# Patient Record
Sex: FEMALE | Race: WHITE
[De-identification: names, ages, dates, MRNs, and addresses within clinical notes are randomized per-mention and may not be internally consistent; named-entity substitution may affect disease eponyms.]

---

## 2017-10-04 ENCOUNTER — HOSPITAL ENCOUNTER (OUTPATIENT)
Dept: HOSPITAL 75 - CARD | Age: 62
End: 2017-10-04
Attending: PHYSICIAN ASSISTANT
Payer: MEDICARE

## 2017-10-04 VITALS — WEIGHT: 158 LBS | BODY MASS INDEX: 26.33 KG/M2 | HEIGHT: 65 IN

## 2017-10-04 VITALS — DIASTOLIC BLOOD PRESSURE: 92 MMHG | SYSTOLIC BLOOD PRESSURE: 121 MMHG

## 2017-10-04 PROCEDURE — 93017 CV STRESS TEST TRACING ONLY: CPT

## 2017-10-04 PROCEDURE — 78452 HT MUSCLE IMAGE SPECT MULT: CPT

## 2017-10-05 NOTE — STRESS TEST
DATE OF SERVICE:  10/04/2017



LEXISCAN MYOVIEW 



REFERRING PHYSICIAN:

None.



Baseline heart rate is 91.  Baseline blood pressure 121/92.  Baseline EKG is

sinus rhythm with right bundle branch block, occasional PVCs.  



IN SUMMARY:  

The patient was injected with 10.4 mCi of technetium-99 Myoview and the resting

images were obtained.  Then, the patient received 0.4 mg of Lexiscan followed by

30.4 mCi of technetium-99 Myoview.  Throughout the test, there were no EKG

changes.



The resting and stress images were reviewed and compared in the short axis,

horizontal long axis and vertical long axis views.  Review of the images showed

good radiotracer uptake with no ischemia or infarction on SPECT images.  SSS is

zero, TID value 0.99.  On the gated images, the left ventricle appeared to be

normal size with normal contractility.  Calculated ejection fraction 59%.



CONCLUSION:

1.  The patient tolerated Lexiscan well.

2.  Breast attenuation with no significant ischemia or infarction on SPECT

images.

3.  Normal left ventricular size with normal contractility.  Calculated ejection

fraction 59%.





Job ID: [id number]

DocumentID: [id number]

Dictated Date:  10/04/2017 15:38:08

Transcription Date: 10/05/2017 08:02:14

Dictated By: THERON CENTENO MD

## 2017-10-06 ENCOUNTER — HOSPITAL ENCOUNTER (OUTPATIENT)
Dept: HOSPITAL 75 - CARD | Age: 62
End: 2017-10-06
Attending: PHYSICIAN ASSISTANT
Payer: MEDICARE

## 2017-10-06 DIAGNOSIS — I25.10: Primary | ICD-10-CM

## 2017-10-06 DIAGNOSIS — I73.9: ICD-10-CM

## 2017-10-06 DIAGNOSIS — I10: ICD-10-CM

## 2017-10-06 DIAGNOSIS — I65.23: ICD-10-CM

## 2017-10-06 DIAGNOSIS — E78.2: ICD-10-CM

## 2017-10-06 PROCEDURE — 93306 TTE W/DOPPLER COMPLETE: CPT

## 2017-10-11 ENCOUNTER — HOSPITAL ENCOUNTER (OUTPATIENT)
Dept: HOSPITAL 75 - CATH | Age: 62
LOS: 1 days | Discharge: HOME | End: 2017-10-12
Attending: INTERNAL MEDICINE
Payer: MEDICARE

## 2017-10-11 VITALS — DIASTOLIC BLOOD PRESSURE: 78 MMHG | SYSTOLIC BLOOD PRESSURE: 128 MMHG

## 2017-10-11 VITALS — HEIGHT: 65 IN | WEIGHT: 158 LBS | BODY MASS INDEX: 26.33 KG/M2

## 2017-10-11 VITALS — SYSTOLIC BLOOD PRESSURE: 118 MMHG | DIASTOLIC BLOOD PRESSURE: 66 MMHG

## 2017-10-11 VITALS — DIASTOLIC BLOOD PRESSURE: 79 MMHG | SYSTOLIC BLOOD PRESSURE: 126 MMHG

## 2017-10-11 VITALS — SYSTOLIC BLOOD PRESSURE: 125 MMHG | DIASTOLIC BLOOD PRESSURE: 82 MMHG

## 2017-10-11 VITALS — DIASTOLIC BLOOD PRESSURE: 55 MMHG | SYSTOLIC BLOOD PRESSURE: 109 MMHG

## 2017-10-11 VITALS — SYSTOLIC BLOOD PRESSURE: 104 MMHG | DIASTOLIC BLOOD PRESSURE: 64 MMHG

## 2017-10-11 VITALS — SYSTOLIC BLOOD PRESSURE: 94 MMHG | DIASTOLIC BLOOD PRESSURE: 54 MMHG

## 2017-10-11 VITALS — DIASTOLIC BLOOD PRESSURE: 66 MMHG | SYSTOLIC BLOOD PRESSURE: 108 MMHG

## 2017-10-11 VITALS — SYSTOLIC BLOOD PRESSURE: 105 MMHG | DIASTOLIC BLOOD PRESSURE: 48 MMHG

## 2017-10-11 VITALS — SYSTOLIC BLOOD PRESSURE: 110 MMHG | DIASTOLIC BLOOD PRESSURE: 54 MMHG

## 2017-10-11 VITALS — DIASTOLIC BLOOD PRESSURE: 61 MMHG | SYSTOLIC BLOOD PRESSURE: 122 MMHG

## 2017-10-11 VITALS — SYSTOLIC BLOOD PRESSURE: 113 MMHG | DIASTOLIC BLOOD PRESSURE: 51 MMHG

## 2017-10-11 DIAGNOSIS — Z79.899: ICD-10-CM

## 2017-10-11 DIAGNOSIS — I10: ICD-10-CM

## 2017-10-11 DIAGNOSIS — I25.2: ICD-10-CM

## 2017-10-11 DIAGNOSIS — I71.4: ICD-10-CM

## 2017-10-11 DIAGNOSIS — Z95.1: ICD-10-CM

## 2017-10-11 DIAGNOSIS — E78.2: ICD-10-CM

## 2017-10-11 DIAGNOSIS — Z72.0: ICD-10-CM

## 2017-10-11 DIAGNOSIS — I70.213: Primary | ICD-10-CM

## 2017-10-11 LAB
ALBUMIN SERPL-MCNC: 4 GM/DL (ref 3.2–4.5)
ALT SERPL-CCNC: 14 U/L (ref 0–55)
ANION GAP SERPL CALC-SCNC: 7 MMOL/L (ref 5–14)
APTT BLD: 27 SEC (ref 24–35)
AST SERPL-CCNC: 17 U/L (ref 5–34)
BILIRUB SERPL-MCNC: 0.5 MG/DL (ref 0.1–1)
BUN SERPL-MCNC: 5 MG/DL (ref 7–18)
BUN/CREAT SERPL: 7
CALCIUM SERPL-MCNC: 9.4 MG/DL (ref 8.5–10.1)
CHLORIDE SERPL-SCNC: 107 MMOL/L (ref 98–107)
CHOLEST SERPL-MCNC: 243 MG/DL (ref ?–200)
CO2 SERPL-SCNC: 28 MMOL/L (ref 21–32)
CREAT SERPL-MCNC: 0.69 MG/DL (ref 0.6–1.3)
ERYTHROCYTE [DISTWIDTH] IN BLOOD BY AUTOMATED COUNT: 13.8 % (ref 10–14.5)
GFR SERPLBLD BASED ON 1.73 SQ M-ARVRAT: > 60 ML/MIN
GLUCOSE SERPL-MCNC: 95 MG/DL (ref 70–105)
INR PPP: 0.9 (ref 0.8–1.4)
LDLC SERPL DIRECT ASSAY-MCNC: 161 MG/DL (ref 1–129)
MCH RBC QN AUTO: 32 PG (ref 25–34)
MCHC RBC AUTO-ENTMCNC: 33 G/DL (ref 32–36)
MCV RBC AUTO: 98 FL (ref 80–99)
PLATELET # BLD: 246 10^3/UL (ref 130–400)
PMV BLD AUTO: 10.4 FL (ref 7.4–10.4)
POTASSIUM SERPL-SCNC: 4.1 MMOL/L (ref 3.6–5)
PROT SERPL-MCNC: 7.6 GM/DL (ref 6.4–8.2)
PROTHROMBIN TIME: 12.5 SEC (ref 12.2–14.7)
RBC # BLD AUTO: 5.05 10^6/UL (ref 4.35–5.85)
SODIUM SERPL-SCNC: 142 MMOL/L (ref 135–145)
TRIGL SERPL-MCNC: 270 MG/DL (ref ?–150)
VLDLC SERPL CALC-MCNC: 54 MG/DL (ref 5–40)
WBC # BLD AUTO: 8.5 10^3/UL (ref 4.3–11)

## 2017-10-11 PROCEDURE — 37221: CPT

## 2017-10-11 PROCEDURE — 87081 CULTURE SCREEN ONLY: CPT

## 2017-10-11 PROCEDURE — 36415 COLL VENOUS BLD VENIPUNCTURE: CPT

## 2017-10-11 PROCEDURE — 75774 ARTERY X-RAY EACH VESSEL: CPT

## 2017-10-11 PROCEDURE — 85610 PROTHROMBIN TIME: CPT

## 2017-10-11 PROCEDURE — 75625 CONTRAST EXAM ABDOMINL AORTA: CPT

## 2017-10-11 PROCEDURE — 85027 COMPLETE CBC AUTOMATED: CPT

## 2017-10-11 PROCEDURE — 93005 ELECTROCARDIOGRAM TRACING: CPT

## 2017-10-11 PROCEDURE — 85347 COAGULATION TIME ACTIVATED: CPT

## 2017-10-11 PROCEDURE — 85730 THROMBOPLASTIN TIME PARTIAL: CPT

## 2017-10-11 PROCEDURE — 75716 ARTERY X-RAYS ARMS/LEGS: CPT

## 2017-10-11 PROCEDURE — 80061 LIPID PANEL: CPT

## 2017-10-11 PROCEDURE — 80053 COMPREHEN METABOLIC PANEL: CPT

## 2017-10-11 PROCEDURE — 36247 INS CATH ABD/L-EXT ART 3RD: CPT

## 2017-10-11 PROCEDURE — 71010: CPT

## 2017-10-11 PROCEDURE — 80048 BASIC METABOLIC PNL TOTAL CA: CPT

## 2017-10-11 RX ADMIN — SODIUM CHLORIDE SCH MLS/HR: 900 INJECTION, SOLUTION INTRAVENOUS at 14:10

## 2017-10-11 RX ADMIN — SODIUM CHLORIDE SCH MLS/HR: 900 INJECTION, SOLUTION INTRAVENOUS at 23:08

## 2017-10-11 RX ADMIN — OXYCODONE HYDROCHLORIDE AND ACETAMINOPHEN PRN TAB: 5; 325 TABLET ORAL at 15:18

## 2017-10-11 RX ADMIN — OXYCODONE HYDROCHLORIDE AND ACETAMINOPHEN PRN TAB: 5; 325 TABLET ORAL at 20:46

## 2017-10-11 NOTE — XMS REPORT
[facility]

 Created on: 2016



TawnyLeonarda cardoza

External Reference #: [id number]

: 1955

Sex: Female



Demographics







 Address  [address] [place] [address]  [id number]

 

 Home Phone  [phone]

 

 Preferred Language  Unknown

 

 Marital Status  Unknown

 

 Yazidism Affiliation  Unknown

 

 Race  White

 

 Ethnic Group  Not  or 





Author







 Author  FILI FOSS

 

 Organization  [employer]

 

 Address  Unknown

 

 Phone  Unavailable







Care Team Providers







 Care Team Member Name  Role  Phone

 

 FILI FOSS  CP  Unavailable



                                                                



Allergies, Adverse Reactions, Alerts

          





 Substance  Reaction  Event Type

 

 Codeine Sulfate  Info Not Available  Drug Allergy



                                                                               
         



Problems

          





 Problem Type  Condition  Code  Onset Dates  Condition Status

 

 Assessment  Right hip pain  M25.551     Active

 

 Problem  Unspecified infective otitis externa  380.10     Active



                                                                               
                   



Medications

          





 Medication  Code System  Code  Instructions  Start Date  End Date  Status  
Dosage

 

 Effient  NDC  28614676797  10 MG Oral            not defined

 

 Enalapril Maleate  NDC  00955468333  2.5 MG Oral            not defined

 

 Carvedilol  NDC  [id number]  6.25 MG Oral            not defined

 

 Tramadol HCl  NDC  [address]-11  50 mg Orally 3 times a day  2016  
      1 tablet as needed

 

 Atorvastatin Calcium  NDC  [id number]  10 MG Oral            not defined



                                                                               
                                                 



Procedures

          





 Procedure  Coding System  Code  Date

 

 [facility] VISIT ESTABLISHED PATIENT  CPT-4    2016

 

 Office Visit, Est Pt., Level 3  CPT-4  52935  2016

 

 X-RAY EXAM HIP UNI 2-3 VIEWS  CPT-4  33803  2016



                                                                               
                                       



Vital Signs

          





 Date/Time:  2016

 

 Temperature  98.8 F

 

 Weight  147 lbs

 

 Height  64 in

 

 BMI  25.23 Index

 

 Blood Pressure Diastolic  78 mmHg

 

 Blood Pressure Systolic  106 mmHg

 

 Cardiac Monitoring Heart Rate  72 bpm



                                                                              



Results

          No Known Results                                                     
               



Summary Purpose

          [employer] Submission

## 2017-10-11 NOTE — CARDIAC PROCEDURE NOTE-CS/ASA
Pre-Procedure Note


Pre-Op Procedure Note


H&P Reviewed


The H&P was reviewed, patient examined and no changes noted.


Date H&P Reviewed:  Oct 11, 2017


Time H&P Reviewed:  11:55





Conscious Sedation Pre-Proced


Time Reviewed:  11:55


ASA Class:  3











Airway Mallampati Classification: ([place] appropriate class) I.  II.  III,  IV


 


Lungs 


 


Heart 


 


 ASA score


 


 ASA 1: a normal healthy patient


 


 ASA 2:  a patient with a mild systemic disease (mid diabetes, controlled 

hypertension, obesity 


 


x ASA 3:  a patient with a severe systemic disease that limits activity  (angina

, COPD, prior Myocardial infarction)


 


 ASA 4:  a patient with an incapacitating disease that is a constant threat to 

life (CHF, renal failure)


 


 ASA 5:  a moribund patient not expected to survive 24 hrs.  (ruptured aneurysm)


 


 ASA 6:  a declared brain dead patient whose organs are being harvested.


 


 For emergent operations, add the letter E after the classification








Grade 3


Sedation Plan:  Analgesia, Amnesia, Plan communicated to team members, 

Discussed options with patient/fam, Discussed risks with patient/fam


Note


The patient is an appropriate candidate to undergo the planned procedure, 

sedation, and anesthesia.





The patient immediately re-assessed prior to indication.











THERON CENTENO MD Oct 11, 2017 12:29

## 2017-10-11 NOTE — DIAGNOSTIC IMAGING REPORT
Portable upright radiograph of the chest.



INDICATION: Peripheral angiography.



FINDINGS:

The lungs demonstrate interstitial thickening and suggestion of

scarring in the lateral aspect of the mid to lower left lung. No

focal acute consolidation is seen when compared to 2/21/16. The

heart size is at the upper limits of normal. No effusion or

pneumothorax.



The mediastinum and ada appear unremarkable. Post CABG changes

are seen.



IMPRESSION: Chronic appearing interstitial thickening and

scarring. No acute process.



Dictated by: 



  Dictated on workstation # [id number]

## 2017-10-11 NOTE — PERIPHERAL REPORT
Peripheral Report


Physician (s)/Assistant (s)


Physician


THERON CENTENO MD





Pre-Procedure Diagnosis


Pre-Procedure Diagnosis:  peripheral arterial disease





Post-Procedure Note


Procedure Start Date:  Oct 11, 2017


Procedure Start Time:  11:55


Name of Procedure:  


Abdominal aortogram


Bilateral lower extremity runoff


Third order


Additional views


Bilateral iliac kissing stents


Findings/Procedure Note


PROCEDURE NOTE:





After explaining the procedure to the patient, all pros and cons were explained,


all questions were answered.  The patient signed the consent and then she  was


placed on the cardiac catheterization laboratory.





The patient was placed on the cardiac catheterization laboratory.


Groin was prepped SL fashion local anesthesia was used. 


Sheath placed in the right femoral artery


Runoff of the right lower except he was done


Pigtail catheter advanced the abdominal aorta, abdominal aortogram was done


Crossed over to the left side and a straight catheter was placed in the iliac 

artery, runoff was done then advanced to the popliteal artery and I did 

angiogram to evaluate the trifurcation, additional angiogram to evaluate the 

arteries at the foot level.  Pressure was measured during pullback at the 

popliteal artery, proximal SFA, iliac artery, abdominal aorta, right iliac 

artery, there was a 30 mmHg gradient between the abdominal aorta and iliac 

artery.





Patient was given 5008 of heparin, additional sheath was placed in the left 

femoral artery, I advanced to wires through the sheath to the abdominal aorta 

then placed bilateral iliac kissing stent using on the right Omnilink [id number] 

mm and on the left 6 x 29 mm, postdilated the right side with [place] 820 mm 

with excellent results after putting the pigtail through the left sheath and 

performing abdominal aortogram again.


At the end of the procedure both sheaths were removed and closure device used 

on both sides





Radiation 233 mGy





FINDINGS:


Hemodynamics


Abdominal aorta 148/60 mean of 95


Right iliac 129/60 mean of 90


Left iliac 120/60 mean of 89


Proximal /66 mean of 87


Left popliteal 108/66 mean of 85





Anatomy


1.  Abdominal aortogram showed mild aneurysmal dilatation at the abdominal 

aorta and bifurcation, severe stenosis at both iliac arteries, successful using 

kissing stent with Omnilink Elite 7 1029 on the right and 629 on the left, the 

right side was post dilated to 8 mm with excellent results no residual stenosis


2.  Mild to moderate disease at the left lower extremity, down to the foot with 

small vessel disease and slow flow at the foot level


3.  Mild to moderate disease at the right lower extremities down to the 

trifurcation








CONCLUSIONS:


1.  Severe bilateral iliac stenosis successful kissing stent deployment using 

Omnilink Elite 729 on the right expanded to 8 mm and 629 on the left deployed 

up to 6.5 mm with excellent results with no residual stenosis


2.  Small infrarenal abdominal aortic aneurysm


3.  Small vessel disease below the knee bilaterally with slow flow with no 

significant obstructive disease











DISCUSSION AND RECOMMENDATIONS:


I will continue with maximizing medical therapy


Anesthesia Type:  Conscious Sedation


Estimated blood loss (mL):  15 ml


Contrast Amount:  66 ml





Post-Procedure Diagnosis


Post-operative diagnosis:  


Claudication


Peripheral arterial disease


Abdominal aortic aneurysm


Hypertension


Hyperlipidemia


Coronary artery disease











THERON CENTENO MD Oct 11, 2017 12:42

## 2017-10-12 VITALS — SYSTOLIC BLOOD PRESSURE: 102 MMHG | DIASTOLIC BLOOD PRESSURE: 69 MMHG

## 2017-10-12 VITALS — DIASTOLIC BLOOD PRESSURE: 69 MMHG | SYSTOLIC BLOOD PRESSURE: 102 MMHG

## 2017-10-12 VITALS — DIASTOLIC BLOOD PRESSURE: 73 MMHG | SYSTOLIC BLOOD PRESSURE: 95 MMHG

## 2017-10-12 LAB
ANION GAP SERPL CALC-SCNC: 10 MMOL/L (ref 5–14)
BUN SERPL-MCNC: 11 MG/DL (ref 7–18)
BUN/CREAT SERPL: 16
CALCIUM SERPL-MCNC: 8.9 MG/DL (ref 8.5–10.1)
CHLORIDE SERPL-SCNC: 106 MMOL/L (ref 98–107)
CO2 SERPL-SCNC: 24 MMOL/L (ref 21–32)
CREAT SERPL-MCNC: 0.67 MG/DL (ref 0.6–1.3)
ERYTHROCYTE [DISTWIDTH] IN BLOOD BY AUTOMATED COUNT: 13.7 % (ref 10–14.5)
GFR SERPLBLD BASED ON 1.73 SQ M-ARVRAT: > 60 ML/MIN
GLUCOSE SERPL-MCNC: 95 MG/DL (ref 70–105)
MCH RBC QN AUTO: 32 PG (ref 25–34)
MCHC RBC AUTO-ENTMCNC: 32 G/DL (ref 32–36)
MCV RBC AUTO: 99 FL (ref 80–99)
PLATELET # BLD: 226 10^3/UL (ref 130–400)
PMV BLD AUTO: 10.4 FL (ref 7.4–10.4)
POTASSIUM SERPL-SCNC: 4.3 MMOL/L (ref 3.6–5)
RBC # BLD AUTO: 4.6 10^6/UL (ref 4.35–5.85)
SODIUM SERPL-SCNC: 140 MMOL/L (ref 135–145)
WBC # BLD AUTO: 11.4 10^3/UL (ref 4.3–11)

## 2017-10-12 NOTE — DISCHARGE INST-POST CATH
Discharge Inst-CATH


Post Cardiac Cath D/C Inst


Follow Up/Plan


Appointment with Dr. Vergara's office in 2 weeks


CARDIAC CATH DISCHARGE INSTRUCTIONS





*Hold Metformin for 48 hours post heart cath.





ACTIVITY





* Go Home directly and rest.


* Limit activity of the leg (or wrist if it was used) for 7 days including 

aerobics, swimming,


   jogging, bicycling, etc.


* Restrict stair-climbing for 7 days if possible, if not, climb up with your non

-cath leg, then


   bring together on the same step.


* Avoid lifting, pushing, pulling or excessive movement of the affected 

extremity for 7 days.


* Customary sexual activity may be resumed after 2 days-use caution not to use 

a position  


   that strains or causes pain to the affected extremity.


* No driving for 24 hours.


* NO SMOKING. 


* Avoid straining for bowel movements for 7 days.


* Gentle walking on level ground is allowed.


* Returning to work will depend on the type of procedure and the results. Your 

doctor will discuss


   this with you.





CALL YOUR DOCTOR FOR ANY OF THE FOLLOWING:





*If bleeding from the puncture site occurs- Apply gentle pressure to site with 

clean cloth and call


   your doctor or EMS.


* If a knot or lump forms under the skin, increases in size, or causes pain.


* If bruising appears to be worsening or moving further down your leg instead 

of disappearing.


* Temperature above 101 F.





CARE OF YOUR GROIN INCISION;





* Bruising or purple discoloration of the skin near the puncture site is common.


* You may shower only, no bathtub bathing for 5 days.  Be careful to avoid 

slipping as your


   leg may feel stiff.


* If a closure device was used on your femoral artery, please see the attached 

guide regarding


   care of the device and your leg.


* REMOVE the dressing from your groin the next day after your procedure in the 

shower.





CARE OF YOUR WRIST INCISION;





* Bruising or purple discoloration of the skin near the puncture site is common.


* You may shower.


* DO NOT submerge wrist.


* Remove dressing in 24 hours.











THERON VERGARA MD Oct 12, 2017 08:14

## 2017-10-12 NOTE — CARDIOLOGY PROGRESS NOTE
Subjective


Date Seen by Provider:  Oct 12, 2017


Time Seen by Provider:  08:12


Subjective/Events-last exam


patient is feeling well.  Groin is healing well.  Pedal pulses palpable.  No 

chest pain or shortness of breath


Review of Systems


General:  No Chills, No Night Sweats, No Fatigue, No Malaise, No Appetite, No 

Other


HEENT:  No Head Aches, No Visual Changes, No Eye Pain, No Ear Pain, No Dysphasia

, No Sinus Congestion, No Post Nasal Drip, No Sore Throat, No Other


Pulmonary:  No Dyspnea, No Cough, No Pleuritic Chest Pain, No Other


Cardiovascular:  No: Chest Pain, Palpitations, Orthopnea, Paroxysmal Noc. 

Dyspnea, Edema, Lt Headedness, Other





Objective-Cardiology


Exam


Last Set of Vital Signs





Vital Signs








 10/12/17 10/12/17





 07:19 07:59


 


Temp  97.7


 


Pulse  98


 


Resp  10


 


B/P (MAP)  102/69


 


Pulse Ox  91


 


O2 Delivery  Room Air


 


O2 Flow Rate 2.00 





Capillary Refill : Less Than 3 Seconds


General:  Alert, Oriented X3, Cooperative


HEENT:  Atraumatic, PERRLA


Neck:  Supple, No JVD, No Thyromegaly


Lungs:  Clear to Auscultation, Normal Air Movement


Heart:  Regular Rate, Normal S1, Normal S2, No Murmurs


Abdomen:  Normal Bowel Sounds, Soft, No Tenderness, No Hepatosplenomegaly, No 

Masses


Extremities:  No Clubbing, No Cyanosis, No Edema, Normal Pulses, No Tenderness/

Swelling


Skin:  No Rashes, No Breakdown, No Significant Lesion


Neuro:  Normal Gait, Normal Speech, Strength at 5/5 X4 Ext, Normal Tone, 

Sensation Intact


Psych/Mental Status:  Mental Status NL, Mood NL





Results


Lab


Laboratory Tests


10/11/17 09:12








10/12/17 04:06














A/P-Cardiology


Admission Diagnosis


peripheral arterial disease


Hypertension


Hyperlipidemia


Claudication








Assessment/Plan


Peripheral arterial disease status post pertains intervention.


1.  Severe bilateral iliac stenosis successful kissing stent deployment using 

Omnilink Elite 729 on the right expanded to 8 mm and 629 on the left deployed 

up to 6.5 mm with excellent results with no residual stenosis


2.  Small infrarenal abdominal aortic aneurysm


3.  Small vessel disease below the knee bilaterally with slow flow with no 

significant obstructive disease





Hypertension, controlled, continue current medication





Hyperlipidemia, continue current medications





Tobaccoism, educated on smoking cessation.





Groin is healing well.  No hematoma.  Good pedal pulse.











THERON CENTENO MD Oct 12, 2017 08:12

## 2018-02-07 ENCOUNTER — HOSPITAL ENCOUNTER (OUTPATIENT)
Dept: HOSPITAL 75 - PREOP | Age: 63
Discharge: HOME | End: 2018-02-07
Attending: ORTHOPAEDIC SURGERY
Payer: MEDICARE

## 2018-02-07 VITALS — HEIGHT: 65 IN | WEIGHT: 160 LBS | BODY MASS INDEX: 26.66 KG/M2

## 2018-02-07 VITALS — DIASTOLIC BLOOD PRESSURE: 73 MMHG | SYSTOLIC BLOOD PRESSURE: 117 MMHG

## 2018-02-07 DIAGNOSIS — Z11.2: ICD-10-CM

## 2018-02-07 DIAGNOSIS — Z01.812: Primary | ICD-10-CM

## 2018-02-07 DIAGNOSIS — M48.02: ICD-10-CM

## 2018-02-07 LAB
BASOPHILS # BLD AUTO: 0.1 10^3/UL (ref 0–0.1)
BASOPHILS NFR BLD AUTO: 1 % (ref 0–10)
EOSINOPHIL # BLD AUTO: 0.2 10^3/UL (ref 0–0.3)
EOSINOPHIL NFR BLD AUTO: 3 % (ref 0–10)
ERYTHROCYTE [DISTWIDTH] IN BLOOD BY AUTOMATED COUNT: 13.8 % (ref 10–14.5)
HCT VFR BLD CALC: 47 % (ref 35–52)
HGB BLD-MCNC: 15.8 G/DL (ref 11.5–16)
LYMPHOCYTES # BLD AUTO: 1.5 X 10^3 (ref 1–4)
LYMPHOCYTES NFR BLD AUTO: 20 % (ref 12–44)
MANUAL DIFFERENTIAL PERFORMED BLD QL: NO
MCH RBC QN AUTO: 32 PG (ref 25–34)
MCHC RBC AUTO-ENTMCNC: 34 G/DL (ref 32–36)
MCV RBC AUTO: 95 FL (ref 80–99)
MONOCYTES # BLD AUTO: 0.8 X 10^3 (ref 0–1)
MONOCYTES NFR BLD AUTO: 10 % (ref 0–12)
NEUTROPHILS # BLD AUTO: 5.2 X 10^3 (ref 1.8–7.8)
NEUTROPHILS NFR BLD AUTO: 67 % (ref 42–75)
PLATELET # BLD: 247 10^3/UL (ref 130–400)
PMV BLD AUTO: 10.2 FL (ref 7.4–10.4)
RBC # BLD AUTO: 4.96 10^6/UL (ref 4.35–5.85)
WBC # BLD AUTO: 7.7 10^3/UL (ref 4.3–11)

## 2018-02-07 PROCEDURE — 85025 COMPLETE CBC W/AUTO DIFF WBC: CPT

## 2018-02-07 PROCEDURE — 87081 CULTURE SCREEN ONLY: CPT

## 2018-02-07 PROCEDURE — 86900 BLOOD TYPING SEROLOGIC ABO: CPT

## 2018-02-07 PROCEDURE — 86850 RBC ANTIBODY SCREEN: CPT

## 2018-02-07 PROCEDURE — 86901 BLOOD TYPING SEROLOGIC RH(D): CPT

## 2018-02-07 PROCEDURE — 36415 COLL VENOUS BLD VENIPUNCTURE: CPT

## 2018-02-19 ENCOUNTER — HOSPITAL ENCOUNTER (INPATIENT)
Dept: HOSPITAL 75 - 4TH | Age: 63
LOS: 2 days | Discharge: HOME | DRG: 472 | End: 2018-02-21
Attending: ORTHOPAEDIC SURGERY | Admitting: ORTHOPAEDIC SURGERY
Payer: MEDICARE

## 2018-02-19 VITALS — SYSTOLIC BLOOD PRESSURE: 109 MMHG | DIASTOLIC BLOOD PRESSURE: 60 MMHG

## 2018-02-19 VITALS — SYSTOLIC BLOOD PRESSURE: 105 MMHG | DIASTOLIC BLOOD PRESSURE: 64 MMHG

## 2018-02-19 VITALS — DIASTOLIC BLOOD PRESSURE: 52 MMHG | SYSTOLIC BLOOD PRESSURE: 97 MMHG

## 2018-02-19 VITALS — DIASTOLIC BLOOD PRESSURE: 63 MMHG | SYSTOLIC BLOOD PRESSURE: 120 MMHG

## 2018-02-19 VITALS — DIASTOLIC BLOOD PRESSURE: 93 MMHG | SYSTOLIC BLOOD PRESSURE: 151 MMHG

## 2018-02-19 VITALS — DIASTOLIC BLOOD PRESSURE: 60 MMHG | SYSTOLIC BLOOD PRESSURE: 105 MMHG

## 2018-02-19 VITALS — DIASTOLIC BLOOD PRESSURE: 60 MMHG | SYSTOLIC BLOOD PRESSURE: 131 MMHG

## 2018-02-19 VITALS — DIASTOLIC BLOOD PRESSURE: 57 MMHG | SYSTOLIC BLOOD PRESSURE: 91 MMHG

## 2018-02-19 VITALS — WEIGHT: 160 LBS | BODY MASS INDEX: 26.66 KG/M2 | HEIGHT: 65 IN

## 2018-02-19 VITALS — SYSTOLIC BLOOD PRESSURE: 129 MMHG | DIASTOLIC BLOOD PRESSURE: 75 MMHG

## 2018-02-19 VITALS — SYSTOLIC BLOOD PRESSURE: 91 MMHG | DIASTOLIC BLOOD PRESSURE: 57 MMHG

## 2018-02-19 DIAGNOSIS — I65.23: ICD-10-CM

## 2018-02-19 DIAGNOSIS — E83.42: ICD-10-CM

## 2018-02-19 DIAGNOSIS — I70.203: ICD-10-CM

## 2018-02-19 DIAGNOSIS — M19.91: ICD-10-CM

## 2018-02-19 DIAGNOSIS — E87.2: ICD-10-CM

## 2018-02-19 DIAGNOSIS — M54.12: ICD-10-CM

## 2018-02-19 DIAGNOSIS — G43.909: ICD-10-CM

## 2018-02-19 DIAGNOSIS — Z95.1: ICD-10-CM

## 2018-02-19 DIAGNOSIS — F17.210: ICD-10-CM

## 2018-02-19 DIAGNOSIS — E78.00: ICD-10-CM

## 2018-02-19 DIAGNOSIS — I25.2: ICD-10-CM

## 2018-02-19 DIAGNOSIS — J44.9: ICD-10-CM

## 2018-02-19 DIAGNOSIS — I25.119: ICD-10-CM

## 2018-02-19 DIAGNOSIS — Z95.5: ICD-10-CM

## 2018-02-19 DIAGNOSIS — M48.02: Primary | ICD-10-CM

## 2018-02-19 DIAGNOSIS — G95.89: ICD-10-CM

## 2018-02-19 DIAGNOSIS — I10: ICD-10-CM

## 2018-02-19 LAB
ALBUMIN SERPL-MCNC: 3.9 GM/DL (ref 3.2–4.5)
ALP SERPL-CCNC: 106 U/L (ref 40–136)
ALT SERPL-CCNC: 10 U/L (ref 0–55)
BILIRUB SERPL-MCNC: 0.4 MG/DL (ref 0.1–1)
BUN/CREAT SERPL: 11
CALCIUM SERPL-MCNC: 9.5 MG/DL (ref 8.5–10.1)
CHLORIDE SERPL-SCNC: 105 MMOL/L (ref 98–107)
CO2 SERPL-SCNC: 22 MMOL/L (ref 21–32)
CREAT SERPL-MCNC: 0.76 MG/DL (ref 0.6–1.3)
ERYTHROCYTE [DISTWIDTH] IN BLOOD BY AUTOMATED COUNT: 13.6 % (ref 10–14.5)
GFR SERPLBLD BASED ON 1.73 SQ M-ARVRAT: > 60 ML/MIN
GLUCOSE SERPL-MCNC: 191 MG/DL (ref 70–105)
HCT VFR BLD CALC: 45 % (ref 35–52)
HGB BLD-MCNC: 14.5 G/DL (ref 11.5–16)
MCH RBC QN AUTO: 31 PG (ref 25–34)
MCHC RBC AUTO-ENTMCNC: 33 G/DL (ref 32–36)
MCV RBC AUTO: 95 FL (ref 80–99)
PLATELET # BLD: 252 10^3/UL (ref 130–400)
PMV BLD AUTO: 9.9 FL (ref 7.4–10.4)
POTASSIUM SERPL-SCNC: 4.4 MMOL/L (ref 3.6–5)
PROT SERPL-MCNC: 7.4 GM/DL (ref 6.4–8.2)
RBC # BLD AUTO: 4.67 10^6/UL (ref 4.35–5.85)
SODIUM SERPL-SCNC: 140 MMOL/L (ref 135–145)
WBC # BLD AUTO: 12.1 10^3/UL (ref 4.3–11)

## 2018-02-19 PROCEDURE — 85027 COMPLETE CBC AUTOMATED: CPT

## 2018-02-19 PROCEDURE — 86900 BLOOD TYPING SEROLOGIC ABO: CPT

## 2018-02-19 PROCEDURE — 85730 THROMBOPLASTIN TIME PARTIAL: CPT

## 2018-02-19 PROCEDURE — 36415 COLL VENOUS BLD VENIPUNCTURE: CPT

## 2018-02-19 PROCEDURE — 84100 ASSAY OF PHOSPHORUS: CPT

## 2018-02-19 PROCEDURE — 85025 COMPLETE CBC W/AUTO DIFF WBC: CPT

## 2018-02-19 PROCEDURE — 82805 BLOOD GASES W/O2 SATURATION: CPT

## 2018-02-19 PROCEDURE — 71045 X-RAY EXAM CHEST 1 VIEW: CPT

## 2018-02-19 PROCEDURE — 94664 DEMO&/EVAL PT USE INHALER: CPT

## 2018-02-19 PROCEDURE — 84484 ASSAY OF TROPONIN QUANT: CPT

## 2018-02-19 PROCEDURE — 86901 BLOOD TYPING SEROLOGIC RH(D): CPT

## 2018-02-19 PROCEDURE — 87081 CULTURE SCREEN ONLY: CPT

## 2018-02-19 PROCEDURE — 93005 ELECTROCARDIOGRAM TRACING: CPT

## 2018-02-19 PROCEDURE — 0RG20A0 FUSION OF 2 OR MORE CERVICAL VERTEBRAL JOINTS WITH INTERBODY FUSION DEVICE, ANTERIOR APPROACH, ANTERIOR COLUMN, OPEN APPROACH: ICD-10-PCS | Performed by: ORTHOPAEDIC SURGERY

## 2018-02-19 PROCEDURE — 72040 X-RAY EXAM NECK SPINE 2-3 VW: CPT

## 2018-02-19 PROCEDURE — 94761 N-INVAS EAR/PLS OXIMETRY MLT: CPT

## 2018-02-19 PROCEDURE — 80048 BASIC METABOLIC PNL TOTAL CA: CPT

## 2018-02-19 PROCEDURE — 80053 COMPREHEN METABOLIC PANEL: CPT

## 2018-02-19 PROCEDURE — 86850 RBC ANTIBODY SCREEN: CPT

## 2018-02-19 PROCEDURE — 83735 ASSAY OF MAGNESIUM: CPT

## 2018-02-19 RX ADMIN — SODIUM CHLORIDE, SODIUM LACTATE, POTASSIUM CHLORIDE, AND CALCIUM CHLORIDE PRN MLS/HR: 600; 310; 30; 20 INJECTION, SOLUTION INTRAVENOUS at 06:55

## 2018-02-19 RX ADMIN — HYDROCODONE BITARTRATE AND ACETAMINOPHEN PRN TAB: 5; 325 TABLET ORAL at 14:36

## 2018-02-19 RX ADMIN — FAMOTIDINE SCH MG: 20 TABLET, FILM COATED ORAL at 18:56

## 2018-02-19 RX ADMIN — SODIUM CHLORIDE SCH MLS/HR: 900 INJECTION INTRAVENOUS at 13:48

## 2018-02-19 RX ADMIN — SODIUM CHLORIDE SCH MLS/HR: 900 INJECTION INTRAVENOUS at 22:17

## 2018-02-19 RX ADMIN — HYDROCODONE BITARTRATE AND ACETAMINOPHEN PRN TAB: 5; 325 TABLET ORAL at 20:03

## 2018-02-19 RX ADMIN — SODIUM CHLORIDE, SODIUM LACTATE, POTASSIUM CHLORIDE, AND CALCIUM CHLORIDE PRN MLS/HR: 600; 310; 30; 20 INJECTION, SOLUTION INTRAVENOUS at 08:17

## 2018-02-19 NOTE — DIAGNOSTIC IMAGING REPORT
INDICATION: Fluoroscopy for ACDF.



Fluoroscopy was provided in the OR during performance of an ACDF.

There appears to be an anterior plate and screws transfixing the

lower cervical spine. 11 seconds of fluoroscopic time was

utilized.



IMPRESSION: Fluoroscopy for ACDF.



Dictated by: 



  Dictated on workstation # [id number]

## 2018-02-19 NOTE — OPERATIVE REPORT
DATE OF SERVICE:  02/19/2018



SURGEON:

DO Feliz.



FIRST ASSISTANT:

_____.



This is a medically necessary procedure.  Assistance is necessary for retraction

of vital neurovascular structures.  Without an assistance, procedure would not

be possible.



PREOPERATIVE DIAGNOSES:

1.  Cervical myelopathy.

2.  Cervical radiculopathy.

3.  Cervical stenosis (central, connective tissue, bony).



POSTOPERATIVE DIAGNOSES:

1.  Cervical myelopathy.

2.  Cervical radiculopathy.

3.  Cervical stenosis (central, connective tissue, bony).



PROCEDURE PERFORMED:

1.  C5-C6, C6-C7 anterior cervical diskectomy and fusion.

2.  C6 corpectomy.

3.  Application of anterior instrumentation C5-C7.

4.  Use of human Allograft for spine.

5.  Use of local bone autograft.



COMPLICATIONS:

None.



SPECIMEN SENT:

None.



DRAINS PLACED:

Hemovac.



ESTIMATED BLOOD LOSS:

Minimal.



ANESTHESIA:

General endotracheal anesthesia with local anesthetic.



HISTORY OF PRESENT ILLNESS:

The patient is a very pleasant 62-year-old female who presented to me with

severe bilateral upper extremity pain, gait changes and myelopathy.  MRI

demonstrated myelomalacia with severe central spinal stenosis at two levels

extending behind the body of C6.  She did wish to proceed with operative

intervention.  She understood the risks and benefits.



OPERATION:

The patient was identified by name and wrist band in the preoperative holding

area.  Her operative site was signed, consent was signed.  SCDs were placed. 

Neuro monitor was hooked up and antibiotics were started.  She was taken to the

operating theater and placed under general endotracheal tube anesthesia and

transferred to the operating room table in the supine position.  Light traction

was placed across the shoulders.  She was prepped and draped in usual sterile

fashion.  Formal timeout was conducted.  Baseline SSEPs were slightly

diminished.  Motors were slightly diminished in one foot.



After formal timeout, a left-sided oblique incision was made over the left

sternocleidomastoid.  Standard anterior cervical approach took place.  I then

verified my level and her lateral x-ray.  I placed [place] distraction across C5

and C6.  I placed self-retaining retractor.  I completed a diskectomy, I taken

down the posterior longitudinal with bilateral foraminotomies.  I then placed

distraction across the C6-C7 interspace.  I once again performed a complete

diskectomy.  I then corpectomized the body of C6 taking down the entire

posterior longitudinal ligament thoroughly decompressing from C5 all the way to

C7.  When I was finished, there was no further compression on the nerve roots. 

I maintained hemostasis.  I sized and chose the appropriate peek corpectomy cage

packed with human allograft and local bone autograft and seated it into

position.  I then chose the appropriate size plate and I placed screws into the

body of C5 and screws in the body of C7.  After this was complete, I maintained

hemostasis.  Final AP and lateral x-ray demonstrated appropriate positioning of

the hardware.  I placed a drain alongside the plate.  I closed the wound in my

usual layered fashion utilizing 3-0 Vicryl followed by running 3-0 subcuticular

stitch.  I applied dressings, took the patient in the supine position to the

PACU where she awoke without incident.  She tolerated the procedure well.



PLAN:

At this time, is to admit the patient for IV antibiotics, IV pain control and

postoperative monitoring.  We will discontinue the drain and Benz per my

protocol.  Have her wear a hard collar while out of bed for six weeks.





Job ID: [id number]

DocumentID: [id number]

Dictated Date:  02/19/2018 09:16:08

Transcription Date: 02/19/2018 14:05:02

Dictated By: CAROLINA GARCIA DO

## 2018-02-20 VITALS — SYSTOLIC BLOOD PRESSURE: 135 MMHG | DIASTOLIC BLOOD PRESSURE: 82 MMHG

## 2018-02-20 VITALS — SYSTOLIC BLOOD PRESSURE: 142 MMHG | DIASTOLIC BLOOD PRESSURE: 86 MMHG

## 2018-02-20 VITALS — DIASTOLIC BLOOD PRESSURE: 61 MMHG | SYSTOLIC BLOOD PRESSURE: 115 MMHG

## 2018-02-20 VITALS — DIASTOLIC BLOOD PRESSURE: 79 MMHG | SYSTOLIC BLOOD PRESSURE: 131 MMHG

## 2018-02-20 VITALS — SYSTOLIC BLOOD PRESSURE: 113 MMHG | DIASTOLIC BLOOD PRESSURE: 59 MMHG

## 2018-02-20 VITALS — DIASTOLIC BLOOD PRESSURE: 61 MMHG | SYSTOLIC BLOOD PRESSURE: 106 MMHG

## 2018-02-20 VITALS — SYSTOLIC BLOOD PRESSURE: 102 MMHG | DIASTOLIC BLOOD PRESSURE: 56 MMHG

## 2018-02-20 VITALS — SYSTOLIC BLOOD PRESSURE: 120 MMHG | DIASTOLIC BLOOD PRESSURE: 84 MMHG

## 2018-02-20 VITALS — SYSTOLIC BLOOD PRESSURE: 107 MMHG | DIASTOLIC BLOOD PRESSURE: 56 MMHG

## 2018-02-20 VITALS — DIASTOLIC BLOOD PRESSURE: 83 MMHG | SYSTOLIC BLOOD PRESSURE: 127 MMHG

## 2018-02-20 VITALS — DIASTOLIC BLOOD PRESSURE: 83 MMHG | SYSTOLIC BLOOD PRESSURE: 122 MMHG

## 2018-02-20 VITALS — SYSTOLIC BLOOD PRESSURE: 101 MMHG | DIASTOLIC BLOOD PRESSURE: 60 MMHG

## 2018-02-20 VITALS — SYSTOLIC BLOOD PRESSURE: 130 MMHG | DIASTOLIC BLOOD PRESSURE: 81 MMHG

## 2018-02-20 VITALS — SYSTOLIC BLOOD PRESSURE: 138 MMHG | DIASTOLIC BLOOD PRESSURE: 81 MMHG

## 2018-02-20 VITALS — SYSTOLIC BLOOD PRESSURE: 119 MMHG | DIASTOLIC BLOOD PRESSURE: 84 MMHG

## 2018-02-20 VITALS — SYSTOLIC BLOOD PRESSURE: 134 MMHG | DIASTOLIC BLOOD PRESSURE: 82 MMHG

## 2018-02-20 VITALS — DIASTOLIC BLOOD PRESSURE: 78 MMHG | SYSTOLIC BLOOD PRESSURE: 135 MMHG

## 2018-02-20 VITALS — DIASTOLIC BLOOD PRESSURE: 79 MMHG | SYSTOLIC BLOOD PRESSURE: 129 MMHG

## 2018-02-20 VITALS — DIASTOLIC BLOOD PRESSURE: 83 MMHG | SYSTOLIC BLOOD PRESSURE: 104 MMHG

## 2018-02-20 VITALS — SYSTOLIC BLOOD PRESSURE: 123 MMHG | DIASTOLIC BLOOD PRESSURE: 76 MMHG

## 2018-02-20 VITALS — DIASTOLIC BLOOD PRESSURE: 81 MMHG | SYSTOLIC BLOOD PRESSURE: 143 MMHG

## 2018-02-20 VITALS — SYSTOLIC BLOOD PRESSURE: 117 MMHG | DIASTOLIC BLOOD PRESSURE: 68 MMHG

## 2018-02-20 LAB
ALBUMIN SERPL-MCNC: 3.6 GM/DL (ref 3.2–4.5)
ALP SERPL-CCNC: 91 U/L (ref 40–136)
ALT SERPL-CCNC: 9 U/L (ref 0–55)
ARTERIAL PATENCY WRIST A: (no result)
BASE EXCESS STD BLDA CALC-SCNC: 2 MMOL/L (ref -2.5–2.5)
BDY SITE: (no result)
BILIRUB SERPL-MCNC: 0.4 MG/DL (ref 0.1–1)
BODY TEMPERATURE: (no result)
BUN/CREAT SERPL: 12
CALCIUM SERPL-MCNC: 9 MG/DL (ref 8.5–10.1)
CHLORIDE SERPL-SCNC: 106 MMOL/L (ref 98–107)
CO2 BLDA CALC-SCNC: 28.9 MMOL/L (ref 21–31)
CO2 SERPL-SCNC: 26 MMOL/L (ref 21–32)
CREAT SERPL-MCNC: 0.67 MG/DL (ref 0.6–1.3)
ERYTHROCYTE [DISTWIDTH] IN BLOOD BY AUTOMATED COUNT: 13.7 % (ref 10–14.5)
GFR SERPLBLD BASED ON 1.73 SQ M-ARVRAT: > 60 ML/MIN
GLUCOSE SERPL-MCNC: 116 MG/DL (ref 70–105)
HCT VFR BLD CALC: 44 % (ref 35–52)
HGB BLD-MCNC: 14.6 G/DL (ref 11.5–16)
INHALED O2 FLOW RATE: 97.1 L/MIN
MCH RBC QN AUTO: 32 PG (ref 25–34)
MCHC RBC AUTO-ENTMCNC: 33 G/DL (ref 32–36)
MCV RBC AUTO: 96 FL (ref 80–99)
PCO2 BLDA: 51 MMHG (ref 35–45)
PH BLDA: 7.34 [PH] (ref 7.37–7.43)
PHOSPHATE SERPL-MCNC: 3.9 MG/DL (ref 2.3–4.7)
PLATELET # BLD: 271 10^3/UL (ref 130–400)
PMV BLD AUTO: 10 FL (ref 7.4–10.4)
PO2 BLDA: 46 MMHG (ref 79–93)
POTASSIUM SERPL-SCNC: 4.5 MMOL/L (ref 3.6–5)
PROT SERPL-MCNC: 6.6 GM/DL (ref 6.4–8.2)
RBC # BLD AUTO: 4.56 10^6/UL (ref 4.35–5.85)
SAO2 % BLDA FROM PO2: 84 % (ref 94–100)
SODIUM SERPL-SCNC: 141 MMOL/L (ref 135–145)
VENTILATION MODE VENT: NO
WBC # BLD AUTO: 15.8 10^3/UL (ref 4.3–11)

## 2018-02-20 RX ADMIN — SODIUM CHLORIDE SCH MLS/HR: 900 INJECTION INTRAVENOUS at 05:27

## 2018-02-20 RX ADMIN — HYDROCODONE BITARTRATE AND ACETAMINOPHEN PRN TAB: 5; 325 TABLET ORAL at 21:12

## 2018-02-20 RX ADMIN — HYDROCODONE BITARTRATE AND ACETAMINOPHEN PRN TAB: 5; 325 TABLET ORAL at 17:04

## 2018-02-20 RX ADMIN — HYDROCODONE BITARTRATE AND ACETAMINOPHEN PRN TAB: 5; 325 TABLET ORAL at 12:26

## 2018-02-20 RX ADMIN — METOPROLOL TARTRATE SCH MG: 25 TABLET, FILM COATED ORAL at 08:19

## 2018-02-20 RX ADMIN — FAMOTIDINE SCH MG: 20 TABLET, FILM COATED ORAL at 08:19

## 2018-02-20 RX ADMIN — METOPROLOL TARTRATE SCH MG: 25 TABLET, FILM COATED ORAL at 21:12

## 2018-02-20 RX ADMIN — Medication SCH EA: at 06:32

## 2018-02-20 RX ADMIN — FAMOTIDINE SCH MG: 20 TABLET, FILM COATED ORAL at 06:32

## 2018-02-20 RX ADMIN — HYDROCODONE BITARTRATE AND ACETAMINOPHEN PRN TAB: 5; 325 TABLET ORAL at 06:30

## 2018-02-20 NOTE — CONSULTATION-CARDIOLOGY
HPI-Cardiology


Cardiology Consultation


Date of Consultation


18


Date of Admission





Time Seen by Provider:  04:51


Indication:  Chest pain





HPI


62 years old lady with extensive history of coronary artery disease and 

peripheral arterial disease, underwent discectomy of her cervical spine, 

postoperatively he started having chest pain and tachycardia.  She had EKG 

changes.  I was called for evaluation.  Transferred her to ICU and start her on 

nitroglycerin and beta blockers.  This morning she is feeling better.  Her 

chest pain is better.  She denied any recent episodes of chest pain or 

shortness of breath.  Has been having neck and back pain.  No palpitation, no 

syncope or near syncopal episodes.  No claudications.





Home Medications & Allergies


Allergies:  


Coded Allergies:  


     codeine (Unverified  Allergy, Mild, N/V, 18)


Home Medication List Reviewed:  Yes





PMH-Social-Family Hx


Patient Social History


Marital Status:  


Alcohol Use:  Denies Use


Recreational Drug Use:  No


Smoking Status:  Current Everyday Smoker


Type Used:  Cigarettes


Recent Foreign Travel:  No


Recent Infectious Disease Expo:  No


Recent Hopitalizations:  Yes (STENTS IN LEGS IN 2017)


Physical Abuse Screen:  No


Sexual Abuse:  No





Immunizations Up To Date


Date of Pneumonia Vaccine:  Oct 1, 2013





Past Medical History


Past medical history is discussed below





Family Medical History


Family History:  


Cardiovascular disease


  19 MOTHER


  G8 BROTHER


Diabetes mellitus


  19 MOTHER


  G8 BROTHER





Constitutional:  see HPI, malaise


EENTM:  see HPI


Respiratory:  see HPI, No cough, No dyspnea on exertion, No hemoptysis, No 

orthopnea, No phlegm, No short of breath, No stridor, No wheezing, No other


Cardiovascular:  see HPI, chest pain, No edema, No Hx of Intervention, No 

palpitations, No syncope, No vascular heart diseas, No other


Gastrointestinal:  no symptoms reported, see HPI


Genitourinary:  see HPI


Musculoskeletal:  see HPI, back pain, joint pain


Skin:  no symptoms reported, see HPI


Psychiatric/Neurological:  No Symptoms Reported, See HPI





Reviewed Test Results


Reviewed Test Results


Lab





Laboratory Tests








Test


  18


21:00 18


21:30 18


00:48 18


04:10 Range/Units


 


 


Sodium Level 140     135-145  MMOL/L


 


Potassium Level 4.4     3.6-5.0  MMOL/L


 


Chloride Level 105       MMOL/L


 


Carbon Dioxide Level 22     21-32  MMOL/L


 


Anion Gap 13     5-14  MMOL/L


 


Blood Urea Nitrogen 8     7-18  MG/DL


 


Creatinine


  0.76 


  


  


  


  0.60-1.30


MG/DL


 


Estimat Glomerular Filtration


Rate > 60 


  


  


  


   


 


 


BUN/Creatinine Ratio 11      


 


Glucose Level 191 H      MG/DL


 


Calcium Level 9.5     8.5-10.1  MG/DL


 


Total Bilirubin 0.4     0.1-1.0  MG/DL


 


Aspartate Amino Transf


(AST/SGOT) 15 


  


  


  


  5-34  U/L


 


 


Alanine Aminotransferase


(ALT/SGPT) 10 


  


  


  


  0-55  U/L


 


 


Alkaline Phosphatase 106       U/L


 


Troponin I < 0.30     <0.30  NG/ML


 


Total Protein 7.4     6.4-8.2  GM/DL


 


Albumin 3.9     3.2-4.5  GM/DL


 


White Blood Count


  


  12.1 H


  


  15.8 H


  4.3-11.0


10^3/uL


 


Red Blood Count


  


  4.67 


  


  4.56 


  4.35-5.85


10^6/uL


 


Hemoglobin  14.5   14.6  11.5-16.0  G/DL


 


Hematocrit  45   44  35-52  %


 


Mean Corpuscular Volume  95   96  80-99  FL


 


Mean Corpuscular Hemoglobin  31   32  25-34  PG


 


Mean Corpuscular Hemoglobin


Concent 


  33 


  


  33 


  32-36  G/DL


 


 


Red Cell Distribution Width  13.6   13.7  10.0-14.5  %


 


Platelet Count


  


  252 


  


  271 


  130-400


10^3/uL


 


Mean Platelet Volume  9.9   10.0  7.4-10.4  FL


 


Blood Gas Puncture Site   L RAD    


 


Blood Gas Patient Temperature   4L    


 


Arterial Blood pH   7.34 *L  7.37-7.43  


 


Arterial Blood Partial


Pressure CO2 


  


  51 H


  


  35-45  MMHG


 


 


Arterial Blood Partial


Pressure O2 


  


  46 L


  


  79-93  MMHG


 


 


Arterial Blood HCO3   27   23-27  MMOL/L


 


Arterial Blood Total CO2


  


  


  28.9 


  


  21.0-31.0


MMOL/L


 


Arterial Blood Oxygen


Saturation 


  


  84 L


  


    %


 


 


Arterial Blood Base Excess


  


  


  2.0 


  


  -2.5-2.5


MMOL/L


 


Rafiq Test   YES-POS    


 


Blood Gas Ventilator Setting   NO    


 


Blood Gas Inspired Oxygen   97.1    











Physical Exam


Vital Signs





Vital Signs - First Documented








 18





 06:48 15:05


 


Temp 96.8 


 


Pulse 98 


 


Resp 18 


 


B/P (MAP) 129/75 (93) 


 


Pulse Ox 91 


 


O2 Delivery Room Air 


 


O2 Flow Rate  5.00





Capillary Refill :


General Appearance:  WD/WN, Moderate Distress


Eyes:  Bilateral Eye Normal Inspection, Bilateral Eye PERRL, Bilateral Eye EOMI


HEENT:  PERRL/EOMI, TMs Normal, Normal ENT Inspection, Pharynx Normal


Neck:  Normal Inspection, Supple, Carotid Bruit, Other (Neck is immobilized)


Respiratory:  Chest Non Tender, Lungs Clear, Normal Breath Sounds, No Accessory 

Muscle Use, No Respiratory Distress


Cardiovascular:  Regular Rate, Rhythm, No Edema, No Gallop, No JVD, Normal 

Peripheral Pulses, Systolic Murmur


Gastrointestinal:  Normal Bowel Sounds, No Organomegaly, No Pulsatile Mass, Non 

Tender, Soft


Back:  Normal Inspection, No CVA Tenderness, No Vertebral Tenderness


Extremity:  Normal Capillary Refill, Normal Inspection, Normal Range of Motion, 

Non Tender, No Calf Tenderness, No Pedal Edema


Neurologic/Psychiatric:  Alert, Oriented x3, No Motor/Sensory Deficits, Normal 

Mood/Affect


Skin:  Normal Color, Warm/Dry


Lymphatic:  No Adenopathy





A/P-Cardiology


Admission Diagnosis


Chest pain resembling angina


Coronary artery disease


Peripheral arterial disease


Hypertension








Assessment/Plan


Chest pain resembling angina, patient had ST depression in the anterior leads, 

improved today.  Responded to sublingual nitroglycerin and beta blockers.  

Feeling better.  I will continue monitoring and continue on nitroglycerin as 

needed





Status post Cervical discectomy and fusion, corpectomy, surgery was done on 

2018





Coronary artery disease status post CABG x4 in .  Patient had a non-ST 

elevation myocardial infarction on 2016.  Underwent cardiac 

catheterization revealing 99% stenosis at mid vein graft to dominant CX artery 

followed by thrombus; successful balloon angioplasty, and 2 stent deployment 

using Promus Premier 3.5 x 12mm followed by 3.5 x 28mm overlapping stents, 

excellent results, no residual stenosis.  Stress test 2017 revealed no 

ischemia or infarct.  Continue to monitor at this time, monitor cardiac enzymes.





Hypertension, restart home medication monitor blood pressure.





Hyperlipidemia, reports intolerance to Lipitor secondary to myalgias.  Treated 

with Crestor 10 mg daily.  Continue to monitor





Tobaccoism-smoking 2 packs per day, educated on the importance of smoking 

cessation





Peripheral arterial disease-underwent peripheral angiogram on 2017 

revealing severe bilateral iliac stenosis, successful kidding stents deployed: 

Omnilink Elite [id number] on the right and 6x29mm on the left. Mild to moderate 

disease at LLE down to foot with small vessel disease and slow flow to the foot 

level. Mild to moderate disease at RLE down to trifurcation.  Continue to 

monitor





Carotid artery stenosis-moderate by heart and vascular care.  Most recent 

carotid duplex done in office in 2016 revealed mild disease 

bilaterally.  Continue to monitor.  Reevaluate carotid duplex today.





Clinical Quality Measures


DVT/VTE Risk/Contraindication:


Risk Factor Score Per Nursin


RFS Level Per Nursing on Admit:  4+=Very High











THERON CENTENO MD 2018 05:00

## 2018-02-20 NOTE — DIAGNOSTIC IMAGING REPORT
INDICATION: Postop chest.



COMPARISON: 10/11/2017



FINDINGS: Single frontal radiographic view of the chest was

obtained and demonstrate stable cardiac silhouette and pulmonary

vasculature. Sternotomy wires are noted. Lungs show curvilinear

opacities within the left lower lung field partially obscuring

left heart border suggestive of scarring and/or atelectasis.

Overall, aeration is stable. There is no large effusion or

pneumothorax on either side. Bony structures show no gross acute

abnormalities.



IMPRESSION:

1. Stable exam of the chest as described above.



Dictated by: 



  Dictated on workstation # [id number]

## 2018-02-20 NOTE — ANESTHESIA-GENERAL POST-OP
General


Patient Condition


Mental Status/LOC:  Same as Preop


Cardiovascular:  Satisfactory


Nausea/Vomiting:  Absent


Respiratory:  Satisfactory


Pain:  Controlled


Complications:  Absent





Post Op Complications


Complications


None





Follow Up Care/Instructions


Patient Instructions


None needed.





Anesthesia/Patient Condition


Patient Condition


Patient is doing well, no complaints, stable vital signs, no apparent adverse 

anesthesia problems.   


No complications reported per nursing.


D/C home per [facility] [place] [facility]:  No











KATHRYN BRIONES CRNA Feb 20, 2018 13:14

## 2018-02-20 NOTE — PROGRESS NOTE (SOAP)
Subjective


Date Seen by Provider:  2018


Time Seen by Provider:  06:51


Subjective/Events-last exam


Leonarda is POD #1 s/p C5-6 ACDF with C6 corpectomy.  She c/o neck pain.  I was 

contacted by Emely CAMPBELL last night reporting patient had chest pain.  EKG had 

already been obtained and showed ST segment depression.  Patient was also 

hypertensive, .4mg sublingual nitro was ordered, morphine had been given, and 

.25 mg Ativan ordered.  She has a hx of CAD with stent placement so called Dr Vergara who felt patient should be observed in ICU.  Cardiac enzymes have been 

negative.  Patient c/o neck pain today but is doing well resting comfortably in 

no apparent distress.


Review of Systems


General:  No Chills


HEENT:  No Head Aches


Pulmonary:  Cough


Cardiovascular:  No: Chest Pain, Palpitations, Orthopnea


Gastrointestinal:  No: Vomiting, Abdominal Pain


Musculoskeletal:  neck pain


Neurological:  No: Weakness, Numbness, Incoordination, Change in speech, 

Confusion





Objective


Exam





Vital Signs








  Date Time  Temp Pulse Resp B/P (MAP) Pulse Ox O2 Delivery O2 Flow Rate FiO2


 


18 06:00  97 18 115/61 (79) 96 High Flow N/C 4.00 


 


18 05:00 96.9       


 


18 05:00  74 14 113/59 (77) 98 High Flow N/C 4.00 


 


18 04:00  77 18 117/68 (84) 94 High Flow N/C 4.00 


 


18 04:00     96 Nasal Cannula 4.00 


 


18 03:00  95 18 101/60 (74) 96 High Flow N/C 4.00 


 


18 02:00  93 18 102/56 (71) 96 High Flow N/C 4.00 


 


18 01:00  93      


 


18 01:00  73 15 107/56 (73) 95 High Flow N/C 4.00 


 


18 00:30  98 24  95 High Flow N/C 4.00 


 


18 00:00  75 16 106/61 (76) 96 High Flow N/C 3.00 


 


18 00:00     96 Nasal Cannula 4.00 


 


18 23:45  87 15 91/57 (68) 96 High Flow N/C 4.00 


 


18 23:30  94 20 91/57 (68) 96 High Flow N/C 4.00 


 


18 23:00  105 16 97/52 (67) 94 High Flow N/C 4.00 


 


18 22:45  99 16 105/60 (75) 93 High Flow N/C 3.00 


 


18 22:30  104 17 109/60 (76) 92 High Flow N/C 4.00 


 


18 22:00  105 17 105/64 (78) 90 High Flow N/C 3.00 


 


18 22:00     96 Nasal Cannula 4.00 


 


18 21:45  133 29 151/93 (112) 87 High Flow N/C 4.00 


 


18 21:45  133      


 


18 20:00      Nasal Cannula 5.00 


 


18 19:45 98.4 118 19 131/60 (83) 94 Nasal Cannula 5.00 


 


18 16:00 98.0 107 17 120/63 (82) 91 Room Air  


 


18 15:08      Nasal Cannula 5.00 


 


18 15:05      Nasal Cannula 5.00 














I & O 


 


 18





 07:00


 


Intake Total 2250 ml


 


Output Total 1100 ml


 


Balance 1150 ml





Capillary Refill :


General Appearance:  No Apparent Distress


Neck:  Normal Inspection, No JVD, Limited Range of Motion, Other (no tracheal 

deviation, bandage is clean and dry)


Respiratory:  No Normal Breath Sounds, No No Accessory Muscle Use, No No 

Respiratory Distress


Extremity:  Normal Inspection, Normal Range of Motion, Non Tender, No Calf 

Tenderness, No Pedal Edema


Neurologic/Psychiatric:  Alert, Oriented x3, No Motor/Sensory Deficits


Skin:  Normal Color, Warm/Dry





Results


Lab


Laboratory Tests


18 21:00: 


Sodium Level 140, Potassium Level 4.4, Chloride Level 105, Carbon Dioxide Level 

22, Anion Gap 13, Blood Urea Nitrogen 8, Creatinine 0.76, Estimat Glomerular 

Filtration Rate > 60, BUN/Creatinine Ratio 11, Glucose Level 191H, Calcium 

Level 9.5, Total Bilirubin 0.4, Aspartate Amino Transf (AST/SGOT) 15, Alanine 

Aminotransferase (ALT/SGPT) 10, Alkaline Phosphatase 106, Troponin I < 0.30, 

Total Protein 7.4, Albumin 3.9


18 21:30: 


White Blood Count 12.1H, Red Blood Count 4.67, Hemoglobin 14.5, Hematocrit 45, 

Mean Corpuscular Volume 95, Mean Corpuscular Hemoglobin 31, Mean Corpuscular 

Hemoglobin Concent 33, Red Cell Distribution Width 13.6, Platelet Count 252, 

Mean Platelet Volume 9.9


18 00:48: 


Blood Gas Puncture Site L RAD, Blood Gas Patient Temperature 4L, Arterial Blood 

pH 7.34*L, Arterial Blood Partial Pressure CO2 51H, Arterial Blood Partial 

Pressure O2 46L, Arterial Blood HCO3 27, Arterial Blood Total CO2 28.9, 

Arterial Blood Oxygen Saturation 84L, Arterial Blood Base Excess 2.0, Rafiq 

Test YES-POS, Blood Gas Ventilator Setting NO, Blood Gas Inspired Oxygen 97.1


18 04:10: 


Sodium Level 141, Potassium Level 4.5, Chloride Level 106, Carbon Dioxide Level 

26, Anion Gap 9, Blood Urea Nitrogen 8, Creatinine 0.67, Estimat Glomerular 

Filtration Rate > 60, BUN/Creatinine Ratio 12, Glucose Level 116H, Calcium 

Level 9.0, Total Bilirubin 0.4, Aspartate Amino Transf (AST/SGOT) 16, Alanine 

Aminotransferase (ALT/SGPT) 9, Alkaline Phosphatase 91, Troponin I < 0.30, 

Total Protein 6.6, Albumin 3.6, White Blood Count 15.8H, Red Blood Count 4.56, 

Hemoglobin 14.6, Hematocrit 44, Mean Corpuscular Volume 96, Mean Corpuscular 

Hemoglobin 32, Mean Corpuscular Hemoglobin Concent 33, Red Cell Distribution 

Width 13.7, Platelet Count 271, Mean Platelet Volume 10.0, Activated Partial 

Thromboplast Time 28, Phosphorus Level 3.9, Magnesium Level 1.7L





Assessment/Plan


Assessment/Plan


Assess & Plan/Chief Complaint


Assessment:





POD #1 s/p C5-7 acdf


angina


CAD





Plan:





continue current treatment


collar on when OOB


PT


cervical x-ray today





Clinical Quality Measures


DVT/VTE Risk/Contraindication:


Risk Factor Score Per Nursin


RFS Level Per Nursing on Admit:  4+=Very High











JUDITH PINZON 2018 06:56

## 2018-02-20 NOTE — PULMONARY CONSULTATION
History of Present Illness


History of Present Illness


Date of Consultation


18


 06:21


Time Seen by Provider:  06:21


Date of Admission





Reason for Visit:  Chest pain


History of Present Illness


63yo pt with hx of extensive CAD, PAD and cervical stenosis now s/p recent 

discectomy of cervical spine admitted after pt started having CP and 

tachycardia. pt was transferred to ICU started on nitroglycerin and beta 

blockers. Denies SOB, or palpitations. I am consulted for ICU management.





Allergies and Home Medications


Allergies


Coded Allergies:  


     codeine (Unverified  Allergy, Mild, N/V, 18)





Home Medications


Acetaminophen/Diphenhydramine 1 Each Tablet, 2 TAB PO HS, (Reported)


Aspirin 81 Mg Tablet.dr, 81 MG PO DAILY, (Reported)


Carvedilol 6.25 Mg Tablet, 6.25 MG PO HS, (Reported)


Enalapril Maleate 2.5 Mg Tablet, 2.5 MG PO DAILY, (Reported)


Gabapentin 300 Mg Capsule, 300 MG PO HS, (Reported)


Prasugrel HCl 10 Mg Tablet, 10 MG PO DAILY, (Reported)





Past Medical-Social-Family Hx


Patient Social History


Alcohol Use:  Denies Use


Recreational Drug Use:  No


Smoking Status:  Current Everyday Smoker


Type Used:  Cigarettes


Recent Foreign Travel:  No


Contact w/Someone Who Travel:  No


Recent Infectious Disease Expo:  No


Recent Hopitalizations:  Yes (STENTS IN LEGS IN 2017)





Immunizations Up To Date


Date of Pneumonia Vaccine:  Oct 1, 2013





Seasonal Allergies


Seasonal Allergies:  Yes





Surgeries


History of Surgeries:  Yes (CARDIAC STENTS, COLON RESECTION, STENTS IN LEGS)


Surgeries:  Appendectomy, CABG





Respiratory


History of Respiratory Disorde:  Yes


Respiratory Disorders:  COPD





Cardiovascular


History of Cardiac Disorders:  Yes


Cardiac Disorders:  Coronary Artery Disease, Heart Attack, High Cholesterol, 

Hypertension, Peripheral Vascular





Neurological


History of Neurological Disord:  Yes


Neurological Disorders:  Headaches /Migraines





Reproductive System


Hx Reproductive Disorders:  No


Sexually Transmitted Disease:  No


HIV/AIDS:  No





Genitourinary


History of Genitourinary Disor:  No





Gastrointestinal


History of Gastrointestinal Di:  Yes


Gastrointestinal Disorders:  Chronic Diarrhea





Musculoskeletal


History of Musculoskeletal Dis:  Yes (STENOSIS)


Musculoskeletal Disorders:  Arthritis, Chronic Back Pain





Endocrine


History of Endocrine Disorders:  No





HEENT


History of HEENT Disorders:  Yes (UPPER DENTURES, GLASSES)


Loss of Vision:  Bilateral


Hearing Impairment:  Denies





Cancer


History of Cancer:  No





Psychosocial


History of Psychiatric Problem:  No





Integumentary


History of Skin or Integumenta:  No





Blood Transfusions


History of Blood Disorders:  No


Adverse Reaction to a Blood Tr:  No (N/A)





Family Medical History


Family Medial History:  


Cardiovascular disease


  19 MOTHER


  G8 BROTHER


Diabetes mellitus


  19 MOTHER


  G8 BROTHER





Exam


Exam





Vital Signs








  Date Time  Temp Pulse Resp B/P (MAP) Pulse Ox O2 Delivery O2 Flow Rate FiO2


 


18 06:00  97 18 115/61 (79) 96 High Flow N/C 4.00 


 


18 05:00 96.9       


 


18 05:00  74 14 113/59 (77) 98 High Flow N/C 4.00 


 


18 04:00  77 18 117/68 (84) 94 High Flow N/C 4.00 


 


18 04:00     96 Nasal Cannula 4.00 


 


18 03:00  95 18 101/60 (74) 96 High Flow N/C 4.00 


 


18 02:00  93 18 102/56 (71) 96 High Flow N/C 4.00 


 


18 01:00  93      


 


18 01:00  73 15 107/56 (73) 95 High Flow N/C 4.00 


 


18 00:30  98 24  95 High Flow N/C 4.00 


 


18 00:00  75 16 106/61 (76) 96 High Flow N/C 3.00 


 


18 00:00     96 Nasal Cannula 4.00 


 


18 23:45  87 15 91/57 (68) 96 High Flow N/C 4.00 


 


18 23:30  94 20 91/57 (68) 96 High Flow N/C 4.00 


 


18 23:00  105 16 97/52 (67) 94 High Flow N/C 4.00 


 


18 22:45  99 16 105/60 (75) 93 High Flow N/C 3.00 


 


18 22:30  104 17 109/60 (76) 92 High Flow N/C 4.00 


 


18 22:00  105 17 105/64 (78) 90 High Flow N/C 3.00 


 


18 22:00     96 Nasal Cannula 4.00 


 


18 21:45  133 29 151/93 (112) 87 High Flow N/C 4.00 


 


18 21:45  133      


 


18 20:00      Nasal Cannula 5.00 


 


18 19:45 98.4 118 19 131/60 (83) 94 Nasal Cannula 5.00 


 


18 16:00 98.0 107 17 120/63 (82) 91 Room Air  


 


18 15:08      Nasal Cannula 5.00 


 


18 15:05      Nasal Cannula 5.00 


 


18 06:48 96.8 98 18 129/75 (93) 91 Room Air  














I & O 


 


 18





 07:00


 


Intake Total 2250 ml


 


Output Total 1100 ml


 


Balance 1150 ml








General Appearance:  WD/WN, Moderate Distress


HEENT:  PERRL/EOMI, TMs Normal, Normal ENT Inspection, Pharynx Normal


Neck:  Normal Inspection, Supple, Carotid Bruit, Other


Respiratory:  Chest Non Tender, Lungs Clear, Normal Breath Sounds, No Accessory 

Muscle Use, No Respiratory Distress


Cardiovascular:  Regular Rate, Rhythm, No Edema, No Gallop, No JVD, Normal 

Peripheral Pulses, Systolic Murmur


Extremity:  Normal Capillary Refill, Normal Inspection, Normal Range of Motion, 

Non Tender, No Calf Tenderness, No Pedal Edema


Neurologic/Psychiatric:  Alert, Oriented x3, No Motor/Sensory Deficits, Normal 

Mood/Affect


Skin:  Normal Color, Warm/Dry


Lymphatic:  No Adenopathy





Results


Lab


Laboratory Tests


18 21:00








18 21:30








18 04:10











Assessment/Plan


Assessment/Plan


CAD with CP and EKG changes


   -Cardiology following 


PAD


Hx of COPD with tobacco dependance - currently stable 


   -Education 


   -SVNS


Respiratory acidosis 


   -PT denies SOB will continue to monitor


Hypomagnesium 


   -Replace





Hx of carotid artery stenosis 

















255





Clinical Quality Measures


DVT/VTE Risk/Contraindication:


Risk Factor Score Per Nursin


RFS Level Per Nursing on Admit:  4+=Very High











NOAH BARBER DO 2018 06:26

## 2018-02-20 NOTE — CONSULTATION-HOSPITALIST
HPI


History of Present Illness:


HPI/Chief Complaint


Pt is a 62yoCF with a PMH of HTN, CAD s/p CABG who was admitted postoperatively 

following cervical discectomy but Dr Piper. She denies any complaints this 

morning. I am consulted for medical management. Overnight she did develop chest 

pain and felt her heart racing and was diaphoretic during the episode. Pain 

improved with nitro. Dr Vergara was consulted and she was transferred to the ICU 

for closer observation. She denies any pain today and reports she is feeling 

well. She is up in chair. She denies any complaints and thinks her chest pain 

was due to take two pain pills at once.


Source:  patient


Date Seen


18


Attending Physician


Yeison Piper Linda K DO


Referring Physician





Date of Admission


2018 at 6:00 am





Home Medications & Allergies


Home Medications


Reviewed patient Home Medication Reconciliation Form





Allergies





Allergies


Coded Allergies


  codeine (Unverified Adverse Reaction, Mild, N/V, 18)








Past Medical-Social-Family Hx


Patient Social History


Marrital Status:  


Alcohol Use:  Denies Use


Recreational Drug Use:  No


Smoking Status:  Current Everyday Smoker


Cigaretts per day:  30


Type Used:  Cigarettes


Physical Abuse Screen:  No


Sexual Abuse:  No


Recent Foreign Travel:  No


Contact w/other who traveled:  No


Recent Hopitalizations:  Yes (STENTS IN LEGS IN 2017)


Recent Infectious Disease Expo:  No





Immunizations Up To Date


Date of Pneumonia Vaccine:  Oct 1, 2013





Seasonal Allergies


Seasonal Allergies:  Yes





Surgeries


Yes (CARDIAC STENTS, COLON RESECTION, STENTS IN LEGS)


Appendectomy, CABG





Respiratory


Yes





Cardiovascular


Yes


Coronary Artery Disease, Heart Attack, High Cholesterol, Hypertension, 

Peripheral Vascular





Neurological


Yes


Headaches /Migraines





Reproductive System


Hx Reproductive Disorders:  No


Sexually Transmitted Disease:  No


HIV/AIDS:  No





Genitourinary


No





Gastrointestinal


Yes


Chronic Diarrhea





Musculoskeletal


Yes (STENOSIS)


Arthritis, Chronic Back Pain





Endocrine


History of Endocrine Disorders:  No





HEENT


History of HEENT Disorders:  Yes (UPPER DENTURES, GLASSES)


Loss of Vision:  Bilateral


Hearing Impairment:  Denies





Cancer


No





Psychosocial


History of Psychiatric Problem:  No





Integumentary


History of Skin or Integumenta:  No





Blood Transfusions


History of Blood Disorders:  No


Adverse Reaction to a Blood Tr:  No (N/A)





Family Medical History


Family Hx:  


Cardiovascular disease


  19 MOTHER


  G8 BROTHER


Diabetes mellitus


  19 MOTHER


  G8 BROTHER





Review of Systems


Constitutional:  No chills, diaphoresis, No fever


EENTM:  No blurred vision, No double vision, No nose congestion, No throat pain


Respiratory:  No cough, No dyspnea on exertion, No short of breath


Cardiovascular:  chest pain, No edema, palpitations


Gastrointestinal:  No abdominal pain, No constipation, No diarrhea, No nausea, 

No vomiting


Genitourinary:  No dysuria, No frequency


Musculoskeletal:  No joint pain, No muscle pain


Skin:  No lesions, No rash


Psychiatric/Neurological:  Denies Headache, Denies Numbness, Denies Tingling





Physical Exam


Physical Exam


Vital Signs





Vital Signs - First Documented








 18





 06:48 15:05


 


Temp 96.8 


 


Pulse 98 


 


Resp 18 


 


B/P (MAP) 129/75 (93) 


 


Pulse Ox 91 


 


O2 Delivery Room Air 


 


O2 Flow Rate  5.00





Capillary Refill :


General Appearance:  No Apparent Distress, WD/WN


HEENT:  PERRL/EOMI, Moist Mucous Membranes


Neck:  Non Tender, Supple


Respiratory:  Lungs Clear, No Respiratory Distress


Cardiovascular:  Regular Rate, Rhythm, No Murmur


Gastrointestinal:  Normal Bowel Sounds, Non Tender, Soft


Extremity:  Normal Capillary Refill, No Calf Tenderness


Neurologic/Psychiatric:  Alert, Oriented x3, Normal Mood/Affect


Skin:  Normal Color, Warm/Dry





Results


Results/Procedures


Lab


Laboratory Tests


18 21:00








18 21:30








18 04:10














Assessment/Plan


Admission Diagnosis


s/p cervical discectomy





Assessment and Plan


Patient follow [facility]. I discussed with Dr Josue who will see tomorrow.





Diagnosis/Problems


Diagnosis/Problems





(1) Stenosis of cervical spine


Status:  Acute


Assessment & Plan:  post op day #1


Management per primary





(2) CAD (coronary artery disease)


Assessment & Plan:  Dr Vergara consulted appreciate recs


Continue on telemetry


Episode last night concerning for angina


Improved with niro- continue


Start ASA when ok with primary


Qualifiers:  


   Qualified Codes:  I25.10 - Atherosclerotic heart disease of native coronary 

artery without angina pectoris


(3) Leukocytosis


Status:  Acute


Assessment & Plan:  Likely reactive from surgery


Trend





(4) Hypomagnesemia


Assessment & Plan:  Replaced per protocol








Clinical Quality Measures


DVT/VTE Risk/Contraindication:


Risk Factor Score Per Nursin


RFS Level Per Nursing on Admit:  4+=Very High











DORENE JACOSB MD 2018 1:06 pm

## 2018-02-20 NOTE — DIAGNOSTIC IMAGING REPORT
PATIENT HISTORY: Cervical spine fusion, postop.     



TECHNIQUE: 2 views of the cervical spine



COMPARISON: MRI from 02/05/2014



FINDINGS: There is anterior fusion of the cervical spine from

C5-C7, with interbody disc spacers at C5-C6 and C6-C7. No

hardware complication is seen. There is mild prevertebral soft

tissue swelling. Alignment appears normal. No acute osseous

abnormality is seen.



IMPRESSION: 

1. Anterior fusion of C5-C7 with no immediate hardware

complication seen.



Dictated by: 



  Dictated on workstation # [id number]

## 2018-02-21 VITALS — DIASTOLIC BLOOD PRESSURE: 98 MMHG | SYSTOLIC BLOOD PRESSURE: 164 MMHG

## 2018-02-21 VITALS — SYSTOLIC BLOOD PRESSURE: 153 MMHG | DIASTOLIC BLOOD PRESSURE: 93 MMHG

## 2018-02-21 VITALS — SYSTOLIC BLOOD PRESSURE: 133 MMHG | DIASTOLIC BLOOD PRESSURE: 87 MMHG

## 2018-02-21 VITALS — DIASTOLIC BLOOD PRESSURE: 104 MMHG | SYSTOLIC BLOOD PRESSURE: 164 MMHG

## 2018-02-21 VITALS — DIASTOLIC BLOOD PRESSURE: 85 MMHG | SYSTOLIC BLOOD PRESSURE: 137 MMHG

## 2018-02-21 VITALS — SYSTOLIC BLOOD PRESSURE: 152 MMHG | DIASTOLIC BLOOD PRESSURE: 98 MMHG

## 2018-02-21 VITALS — SYSTOLIC BLOOD PRESSURE: 131 MMHG | DIASTOLIC BLOOD PRESSURE: 78 MMHG

## 2018-02-21 VITALS — SYSTOLIC BLOOD PRESSURE: 144 MMHG | DIASTOLIC BLOOD PRESSURE: 83 MMHG

## 2018-02-21 VITALS — DIASTOLIC BLOOD PRESSURE: 104 MMHG | SYSTOLIC BLOOD PRESSURE: 151 MMHG

## 2018-02-21 VITALS — DIASTOLIC BLOOD PRESSURE: 94 MMHG | SYSTOLIC BLOOD PRESSURE: 143 MMHG

## 2018-02-21 VITALS — SYSTOLIC BLOOD PRESSURE: 157 MMHG | DIASTOLIC BLOOD PRESSURE: 96 MMHG

## 2018-02-21 VITALS — SYSTOLIC BLOOD PRESSURE: 152 MMHG | DIASTOLIC BLOOD PRESSURE: 94 MMHG

## 2018-02-21 VITALS — DIASTOLIC BLOOD PRESSURE: 92 MMHG | SYSTOLIC BLOOD PRESSURE: 153 MMHG

## 2018-02-21 VITALS — DIASTOLIC BLOOD PRESSURE: 85 MMHG | SYSTOLIC BLOOD PRESSURE: 135 MMHG

## 2018-02-21 VITALS — DIASTOLIC BLOOD PRESSURE: 98 MMHG | SYSTOLIC BLOOD PRESSURE: 154 MMHG

## 2018-02-21 VITALS — SYSTOLIC BLOOD PRESSURE: 156 MMHG | DIASTOLIC BLOOD PRESSURE: 95 MMHG

## 2018-02-21 VITALS — DIASTOLIC BLOOD PRESSURE: 87 MMHG | SYSTOLIC BLOOD PRESSURE: 149 MMHG

## 2018-02-21 VITALS — SYSTOLIC BLOOD PRESSURE: 156 MMHG | DIASTOLIC BLOOD PRESSURE: 99 MMHG

## 2018-02-21 LAB
BASOPHILS # BLD AUTO: 0 10^3/UL (ref 0–0.1)
BASOPHILS NFR BLD AUTO: 0 % (ref 0–10)
BUN/CREAT SERPL: 14
CALCIUM SERPL-MCNC: 8.6 MG/DL (ref 8.5–10.1)
CHLORIDE SERPL-SCNC: 104 MMOL/L (ref 98–107)
CO2 SERPL-SCNC: 27 MMOL/L (ref 21–32)
CREAT SERPL-MCNC: 0.59 MG/DL (ref 0.6–1.3)
EOSINOPHIL # BLD AUTO: 0.1 10^3/UL (ref 0–0.3)
EOSINOPHIL NFR BLD AUTO: 1 % (ref 0–10)
ERYTHROCYTE [DISTWIDTH] IN BLOOD BY AUTOMATED COUNT: 13.9 % (ref 10–14.5)
GFR SERPLBLD BASED ON 1.73 SQ M-ARVRAT: > 60 ML/MIN
GLUCOSE SERPL-MCNC: 93 MG/DL (ref 70–105)
HCT VFR BLD CALC: 43 % (ref 35–52)
HGB BLD-MCNC: 14.1 G/DL (ref 11.5–16)
LYMPHOCYTES # BLD AUTO: 2.1 X 10^3 (ref 1–4)
LYMPHOCYTES NFR BLD AUTO: 19 % (ref 12–44)
MAGNESIUM SERPL-MCNC: 1.7 MG/DL (ref 1.8–2.4)
MANUAL DIFFERENTIAL PERFORMED BLD QL: NO
MCH RBC QN AUTO: 32 PG (ref 25–34)
MCHC RBC AUTO-ENTMCNC: 33 G/DL (ref 32–36)
MCV RBC AUTO: 97 FL (ref 80–99)
MONOCYTES # BLD AUTO: 1.4 X 10^3 (ref 0–1)
MONOCYTES NFR BLD AUTO: 12 % (ref 0–12)
NEUTROPHILS # BLD AUTO: 7.7 X 10^3 (ref 1.8–7.8)
NEUTROPHILS NFR BLD AUTO: 68 % (ref 42–75)
PHOSPHATE SERPL-MCNC: 3 MG/DL (ref 2.3–4.7)
PLATELET # BLD: 226 10^3/UL (ref 130–400)
PMV BLD AUTO: 10.1 FL (ref 7.4–10.4)
POTASSIUM SERPL-SCNC: 3.7 MMOL/L (ref 3.6–5)
RBC # BLD AUTO: 4.39 10^6/UL (ref 4.35–5.85)
SODIUM SERPL-SCNC: 141 MMOL/L (ref 135–145)
WBC # BLD AUTO: 11.3 10^3/UL (ref 4.3–11)

## 2018-02-21 RX ADMIN — FAMOTIDINE SCH MG: 20 TABLET, FILM COATED ORAL at 08:10

## 2018-02-21 RX ADMIN — Medication SCH EA: at 08:10

## 2018-02-21 RX ADMIN — HYDROCODONE BITARTRATE AND ACETAMINOPHEN PRN TAB: 5; 325 TABLET ORAL at 12:21

## 2018-02-21 RX ADMIN — HYDROCODONE BITARTRATE AND ACETAMINOPHEN PRN TAB: 5; 325 TABLET ORAL at 01:41

## 2018-02-21 RX ADMIN — MAGNESIUM SULFATE IN DEXTROSE SCH MLS/HR: 10 INJECTION, SOLUTION INTRAVENOUS at 11:49

## 2018-02-21 RX ADMIN — METOPROLOL TARTRATE SCH MG: 25 TABLET, FILM COATED ORAL at 08:13

## 2018-02-21 RX ADMIN — HYDROCODONE BITARTRATE AND ACETAMINOPHEN PRN TAB: 5; 325 TABLET ORAL at 08:13

## 2018-02-21 NOTE — CARDIOLOGY PROGRESS NOTE
Subjective


Date Seen by Provider:  2018


Time Seen by Provider:  08:27


Subjective/Events-last exam


Patient is sitting up in bed. No further episodes of CP. Denies dyspnea. C/o 

neck and shoulder pain.





Objective-Cardiology


Exam


Last Set of Vital Signs





Vital Signs








 18





 08:14


 


Temp 97.8


 


Pulse 91


 


Resp 20


 


B/P (MAP) 153/92 (112)


 


Pulse Ox 91


 


O2 Delivery Nasal Cannula


 


O2 Flow Rate 1.00





Capillary Refill :


I&O











Intake and Output 


 


 18





 00:00


 


Intake Total 1050 ml


 


Output Total 2200 ml


 


Balance -1150 ml


 


 


 


Intake Oral 850 ml


 


IV Total 200 ml


 


Output Urine Total 2200 ml


 


Drainage Total 0 ml








General:  Alert, Oriented X3, Cooperative


HEENT:  Atraumatic, PERRLA


Neck:  Supple, No JVD, No Thyromegaly


Lungs:  Clear to Auscultation, Normal Air Movement


Heart:  Regular Rate, Normal S1, Normal S2, No Murmurs


Abdomen:  Normal Bowel Sounds, Soft, No Tenderness, No Hepatosplenomegaly, No 

Masses


Extremities:  No Clubbing, No Cyanosis, No Edema, Normal Pulses, No Tenderness/

Swelling


Skin:  No Rashes, No Breakdown, No Significant Lesion


Neuro:  Normal Gait, Normal Speech, Strength at 5/5 X4 Ext, Normal Tone, 

Sensation Intact


Psych/Mental Status:  Mental Status NL, Mood NL





Results


Lab


Laboratory Tests


18 03:25














A/P-Cardiology


Admission Diagnosis


Chest pain resembling angina


Coronary artery disease


Peripheral arterial disease


Hypertension








Assessment/Plan


Chest pain resembling angina, patient had ST depression in the anterior leads, 

improved.   Responded to sublingual nitroglycerin and beta blockers.  Feeling 

better.  I will continue monitoring and continue on nitroglycerin as needed





Status post Cervical discectomy and fusion, corpectomy, surgery was done on 

2018





Coronary artery disease status post CABG x4 in .  Patient had a non-ST 

elevation myocardial infarction on 2016.  Underwent cardiac 

catheterization revealing 99% stenosis at mid vein graft to dominant CX artery 

followed by thrombus; successful balloon angioplasty, and 2 stent deployment 

using Promus Premier 3.5 x 12mm followed by 3.5 x 28mm overlapping stents, 

excellent results, no residual stenosis.  Stress test 2017 revealed no 

ischemia or infarct.  Continue to monitor at this time.





Hypertension, restart home blood pressure medications and continue to monitor. 





Hyperlipidemia, reports intolerance to Lipitor secondary to myalgias.  Treated 

with Crestor 10 mg daily.  Continue to monitor





Tobaccoism-smoking 2 packs per day, educated on the importance of smoking 

cessation





Peripheral arterial disease-underwent peripheral angiogram on 2017 

revealing severe bilateral iliac stenosis, successful kidding stents deployed: 

Omnilink Elite [id number] on the right and 6x29mm on the left. Mild to moderate 

disease at LLE down to foot with small vessel disease and slow flow to the foot 

level. Mild to moderate disease at RLE down to trifurcation.  Continue to 

monitor. Will need to restart home ASA and Effient.





Carotid artery stenosis-moderate by heart and vascular care.  Most recent 

carotid duplex done in office in 2017, continue to monitor.





Clinical Quality Measures


DVT/VTE Risk/Contraindication:


Risk Factor Score Per Nursin


RFS Level Per Nursing on Admit:  4+=Very High











HECTOR REAL 2018 08:31

## 2018-02-21 NOTE — CARDIOLOGY PROGRESS NOTE
Subjective


Date Seen by Provider:  2018


Time Seen by Provider:  09:00


Subjective/Events-last exam


patient is laying down in bed, feeling better, no further episode of chest pain

, neck is better.  No shortness of breath or palpitation.


Review of Systems


General:  No Chills, No Night Sweats, No Fatigue, No Malaise, No Appetite, No 

Other


HEENT:  No Head Aches, No Visual Changes, No Eye Pain, No Ear Pain, No Dysphasia

, No Sinus Congestion, No Post Nasal Drip, No Sore Throat, No Other


Pulmonary:  No Dyspnea, No Cough, No Pleuritic Chest Pain, No Other


Cardiovascular:  No: Chest Pain, Palpitations, Orthopnea, Paroxysmal Noc. 

Dyspnea, Edema, Lt Headedness, Other





Objective-Cardiology


Exam


Last Set of Vital Signs





Vital Signs








 18





 08:14 11:51 12:01


 


Temp  98.4 


 


Pulse 91  


 


Resp 20  


 


B/P (MAP) 153/92 (112)  


 


Pulse Ox   96


 


O2 Delivery   Nasal Cannula


 


O2 Flow Rate   1.00





Capillary Refill :


I&O











Intake and Output 


 


 18





 00:00


 


Intake Total 1050 ml


 


Output Total 2200 ml


 


Balance -1150 ml


 


 


 


Intake Oral 850 ml


 


IV Total 200 ml


 


Output Urine Total 2200 ml


 


Drainage Total 0 ml








General:  Alert, Oriented X3, Cooperative


HEENT:  Atraumatic, PERRLA


Neck:  Supple, No JVD, No Thyromegaly


Lungs:  Clear to Auscultation, Normal Air Movement


Heart:  Regular Rate, Normal S1, Normal S2, No Murmurs


Abdomen:  Normal Bowel Sounds, Soft, No Tenderness, No Hepatosplenomegaly, No 

Masses


Extremities:  No Clubbing, No Cyanosis, No Edema, Normal Pulses, No Tenderness/

Swelling


Skin:  No Rashes, No Breakdown, No Significant Lesion


Neuro:  Normal Gait, Normal Speech, Strength at 5/5 X4 Ext, Normal Tone, 

Sensation Intact


Psych/Mental Status:  Mental Status NL, Mood NL





Results


Lab


Laboratory Tests


18 03:25














A/P-Cardiology


Admission Diagnosis


Chest pain resembling angina


Coronary artery disease


Peripheral arterial disease


Hypertension








Assessment/Plan


Chest pain resembling angina, patient had ST depression in the anterior leads, 

improved.   Responded to sublingual nitroglycerin and beta blockers.  no 

further episodes of chest pain were reported.  Need to restart aspirin once 

deemed safe to be resumed by the surgeon





Status post Cervical discectomy and fusion, corpectomy, surgery was done on 

2018, recovering well.





Coronary artery disease status post CABG x4 in .  Patient had a non-ST 

elevation myocardial infarction on 2016.  Underwent cardiac 

catheterization revealing 99% stenosis at mid vein graft to dominant CX artery 

followed by thrombus; successful balloon angioplasty, and 2 stent deployment 

using Promus Premier 3.5 x 12mm followed by 3.5 x 28mm overlapping stents, 

excellent results, no residual stenosis.  Stress test 2017 revealed no 

ischemia or infarct.  Continue to monitor at this time.





Hypertension, continue to monitor blood pressure.





Hyperlipidemia, reports intolerance to Lipitor secondary to myalgias.  Treated 

with Crestor 10 mg daily.  Continue to monitor





Tobaccoism-smoking 2 packs per day, educated on the importance of smoking 

cessation





Peripheral arterial disease-underwent peripheral angiogram on 2017 

revealing severe bilateral iliac stenosis, successful kidding stents deployed: 

Omnilink Elite [id number] on the right and 6x29mm on the left. Mild to moderate 

disease at LLE down to foot with small vessel disease and slow flow to the foot 

level. Mild to moderate disease at RLE down to trifurcation.  Continue to 

monitor. Will need to restart home ASA and Effient.





Carotid artery stenosis-moderate by heart and vascular care.  Most recent 

carotid duplex done in office in 2017, continue to monitor. 





Patient appeared to be more stable at this time, okay to transfer to telemetry 

floor or discharge when deemed reasonable by the surgeon





Clinical Quality Measures


DVT/VTE Risk/Contraindication:


Risk Factor Score Per Nursin


RFS Level Per Nursing on Admit:  4+=Very High











THERON CENTENO MD 2018 12:29

## 2018-02-21 NOTE — PROGRESS NOTE-HOSPITALIST
Subjective


HPI/CC On Admission


Date Seen by Provider:  Feb 21, 2018


Time Seen by Provider:  10:55


Pt is a 62yoCF with a PMH of HTN, CAD s/p CABG who was admitted postoperatively 

following cervical discectomy but Dr Piper. She denies any complaints this 

morning. I am consulted for medical management. Overnight she did develop chest 

pain and felt her heart racing and was diaphoretic during the episode. Pain 

improved with nitro. Dr Vergara was consulted and she was transferred to the ICU 

for closer observation. She denies any pain today and reports she is feeling 

well. She is up in chair. She denies any complaints and thinks her chest pain 

was due to take two pain pills at once.


Subjective/Events-last exam


Pt reports doing well. No complaints. Passing flatus but no BM yet. Eating 

well. Denies nay further chest pain.





Objective


Exam


Vital Signs





Vital Signs








  Date Time  Temp Pulse Resp B/P (MAP) Pulse Ox O2 Delivery O2 Flow Rate FiO2


 


2/19/18 06:48 96.8 98 18 129/75 (93) 91 Room Air  


 


2/19/18 15:05       5.00 





Capillary Refill :


General Appearance:  No Apparent Distress, WD/WN


Respiratory:  Lungs Clear, No Accessory Muscle Use, No Respiratory Distress


Cardiovascular:  Regular Rate, Rhythm, No Murmur


Neurologic/Psychiatric:  Alert, Oriented x3





Results/Procedures


Lab


Laboratory Tests


2/21/18 03:25














Assessment/Plan


Assessment and Plan


Assess & Plan/Chief Complaint


Patient follow [facility]. I discussed with Dr Josue who will see tomorrow.





Diagnosis/Problems


Diagnosis/Problems





(1) Stenosis of cervical spine


Status:  Acute


Assessment & Plan:  post op day #2


Management per primary


Ok for DC from my POV- defer to primary





(2) CAD (coronary artery disease)


Assessment & Plan:  Dr Vergara consulted appreciate recs


Continue on telemetry


Improved with nitro- continue


Start ASA and Effient when ok with primary


Qualifiers:  


   Qualified Codes:  I25.10 - Atherosclerotic heart disease of native coronary 

artery without angina pectoris


(3) Leukocytosis


Status:  Acute


Assessment & Plan:  Likely reactive from surgery, improving


Trend





(4) Hypomagnesemia


Assessment & Plan:  Replaced per protocol














DORENE JACOBS MD Feb 21, 2018 11:10 am

## 2018-02-21 NOTE — DISCHARGE SUMMARY
Diagnosis/Chief Complaint


Date of Admission


2018 at 06:00


Date of Discharge


2018


Admission Diagnosis


Admission Diagnosis


cervical stenosis with radiculopathy


cad





Discharge Diagnosis


same





Reason Hospital Visit


C5-7 ACDF with C6 corpectomy





Discharge Summary


Hospital Course


Hospital Course


Leonarda is a 61 y/o WF who was admitted to the hospital for ACDF with corpectomy 

for cerivcal stenosis with myelopathy.  She tolerated the procedure well and it 

occured without event.  The evening of the surgery she began to have chest pain 

and she is known to have CAD with stent placement.  EKG showed ST segment 

changes.  Ntg, morphine and ativan.  She Dr Vergara was consulted and she was 

transferred to ICU for further observation.  She has no recurrence of pain.  

Her vitals remained stable. she progressed with therapy and was dismissed home 

on POD #2


Labs


Laboratory Tests


18 21:00: Glucose Level 191H


18 21:30: White Blood Count 12.1H


18 00:48: 


Arterial Blood pH 7.34*L, Arterial Blood Partial Pressure CO2 51H, Arterial 

Blood Partial Pressure O2 46L, Arterial Blood Oxygen Saturation 84L


18 04:10: 


Glucose Level 116H, White Blood Count 15.8H, Magnesium Level 1.7L


18 03:25: 


White Blood Count 11.3H, Monocytes # (Auto) 1.4H, Creatinine 0.59L, Magnesium 

Level 1.7L





Radiology Reviewed


2 view cervical x-ray:  stable ACDF





C5-7 ACDF with C6 corpectomy


Procedures


None.





Discharge Physical Examination


Allergies:  


Coded Allergies:  


     codeine (Unverified  Adverse Reaction, Mild, N/V, 18)


Vitals & I&Os





Vital Signs








  Date Time  Temp Pulse Resp B/P (MAP) Pulse Ox O2 Delivery O2 Flow Rate FiO2


 


18 15:00  81 16 156/95 (115) 92 Room Air  


 


18 13:37       2.00 


 


18 11:51 98.4       








General Appearance:  Oriented X3, Cooperative, No Acute Distress


Respiratory:  Normal Air Movement


Cardiovascular:  Regular Rate


Extremities:  No Edema, Normal Pulses


Neuro:  Normal Gait, Normal Speech, Strength at 5/5 X4 Ext, Sensation Intact, 

Reflexes 2+


Psych/Mental Status:  Mental Status NL





Discharge


Home Medications


Reviewed and agree with Discharge Medication list on patient's Discharge 

Instruction sheet


Instructions to Patient/Family


Please see electronic discharge instructions given to patient.





Clinical Quality Measures


DVT/VTE Risk/Contraindication:


Risk Factor Score Per Nursin


RFS Level Per Nursing on Admit:  4+=Very High











JUDITH PINZON 2018 16:05

## 2018-02-21 NOTE — DIAGNOSTIC IMAGING REPORT
INDICATION: Status post CABG. Surveillance imaging.



COMPARISON: 02/20/2018.



FINDINGS: No change in bibasilar linear opacities, greater on the

left. No pleural effusion or pneumothorax. Stable

cardiomediastinal silhouette status post CABG.



IMPRESSION: No change in basilar opacities favoring postoperative

atelectasis. No adverse development.



Dictated by: 



  Dictated on workstation # [id number]

## 2018-02-21 NOTE — PULMONARY PROGRESS NOTE
Subjective


Time Seen by Provider:  04:02


Subjective/Events-last exam


PT is doing better. No complications noted.





Exam


Exam





Vital Signs








  Date Time  Temp Pulse Resp B/P (MAP) Pulse Ox O2 Delivery O2 Flow Rate FiO2


 


18 03:00  93 25 157/96 (116) 92 Nasal Cannula 3.00 


 


18 02:00  84 11 156/99 (118) 93 Nasal Cannula 3.00 


 


18 01:00  86 15 154/98 (116) 91 Nasal Cannula 3.00 


 


18 01:00  86      


 


18 00:00  90 19 137/85 (102) 93 Nasal Cannula 3.00 


 


18 00:00 98.1       


 


18 00:00     96 Nasal Cannula 3.00 


 


18 23:00  81 16 129/79 (96) 93 Nasal Cannula 3.00 


 


18 22:00  85 16 120/84 (96) 92 Nasal Cannula 3.00 


 


18 21:00  101 21 135/78 (97) 93 Nasal Cannula 3.00 


 


18 20:00 98.7     Nasal Cannula 3.00 


 


18 20:00     96 Nasal Cannula 3.00 


 


18 20:00  100 31 122/83 (96) 94 Nasal Cannula 3.00 


 


18 19:00  90 19 123/76 (92) 94 Nasal Cannula 3.00 


 


18 19:00  90      


 


18 18:00  87 20 134/82 (99) 93 Nasal Cannula 3.00 


 


18 17:00  96 19 131/79 (96) 91 Nasal Cannula 3.00 


 


18 16:00  96 20 143/81 (101) 93 Nasal Cannula 3.00 


 


18 16:00     96 Nasal Cannula 3.00 


 


18 15:00  90 20 142/86 (104) 95 Nasal Cannula 3.00 


 


18 14:00  78 8 138/81 (100) 93 Nasal Cannula  


 


18 13:00  74      


 


18 13:00  74 17 135/82 (99) 94 Nasal Cannula 3.00 


 


18 12:00  70 17 130/81 (97) 95 Nasal Cannula 3.00 


 


18 12:00     94 Nasal Cannula 3.00 


 


18 11:00  98 13 127/83 (98) 95 Nasal Cannula 3.00 


 


18 10:00  90 20 119/84 (96) 96 Nasal Cannula 3.00 


 


18 09:00  76 19  95 Nasal Cannula 3.00 


 


18 08:00     96 Nasal Cannula 3.00 


 


18 08:00 97.7 89 28 104/83 (90) 94 Nasal Cannula 3.00 


 


18 07:00  71      


 


18 06:00  97 18 115/61 (79) 96 High Flow N/C 4.00 


 


18 05:00 96.9       


 


18 05:00  74 14 113/59 (77) 98 High Flow N/C 4.00 


 


18 04:00  77 18 117/68 (84) 94 High Flow N/C 4.00 


 


18 04:00     96 Nasal Cannula 4.00 














I & O 


 


 18





 07:00


 


Intake Total 600 ml


 


Output Total 1900 ml


 


Balance -1300 ml








General Appearance:  No Apparent Distress, WD/WN


HEENT:  PERRL/EOMI, Moist Mucous Membranes


Neck:  Non Tender, Supple


Respiratory:  Lungs Clear, No Respiratory Distress


Cardiovascular:  Regular Rate, Rhythm, No Murmur


Extremity:  Normal Capillary Refill, No Calf Tenderness


Neurologic/Psychiatric:  Alert, Oriented x3, Normal Mood/Affect


Skin:  Normal Color, Warm/Dry


Lymphatic:  No Adenopathy





Results


Lab


Laboratory Tests


18 21:00








18 21:30








18 04:10








18 03:25











Assessment/Plan


Assessment/Plan


CAD with CP and EKG changes


   -Cardiology following 


PAD


Hx of COPD with tobacco dependance - currently stable 


   -Education 


   -SVNS


   -Pt will need 02 eval prior to discharge. SHe does not have 02 currently. 


Hypomagnesium 


   -Replace





Hx of carotid artery stenosis 











Pt is doing better today will transfer to 4th floor from ICU 


232





Clinical Quality Measures


DVT/VTE Risk/Contraindication:


Risk Factor Score Per Nursin


RFS Level Per Nursing on Admit:  4+=Very High











NOAH BARBER DO 2018 04:04

## 2018-02-21 NOTE — DISCHARGE INST-SIMPLE/STANDARD
Discharge Inst-Standard


Discharge Medications


New, Converted or Re-Newed RX:  RX on Chart





Patient Instructions/Follow Up


Plan of Care/Instructions/FU:  


follow up in clinic in 2 weeks


wear c-collar when ambulating


dont bend lift twist push pull


5 lb weight restriction


Activity as Tolerated:  No


Goal:  


walk as tolerated several times a day


Discharge Diet:  Cardiac Diet


Return to The Hospital For:  


chest pain


shortness of breath or difficulty breathing


throat swelling


fever, chills











JUDITH PINZON Feb 21, 2018 15:59

## 2018-05-03 ENCOUNTER — HOSPITAL ENCOUNTER (OUTPATIENT)
Dept: HOSPITAL 75 - RAD | Age: 63
End: 2018-05-03
Attending: PHYSICIAN ASSISTANT
Payer: MEDICARE

## 2018-05-03 DIAGNOSIS — R53.83: ICD-10-CM

## 2018-05-03 DIAGNOSIS — E55.9: ICD-10-CM

## 2018-05-03 DIAGNOSIS — M81.0: Primary | ICD-10-CM

## 2018-05-03 LAB
ERYTHROCYTE [DISTWIDTH] IN BLOOD BY AUTOMATED COUNT: 14.4 % (ref 10–14.5)
HCT VFR BLD CALC: 44 % (ref 35–52)
HGB BLD-MCNC: 14.8 G/DL (ref 11.5–16)
MCH RBC QN AUTO: 33 PG (ref 25–34)
MCHC RBC AUTO-ENTMCNC: 34 G/DL (ref 32–36)
MCV RBC AUTO: 97 FL (ref 80–99)
PLATELET # BLD: 284 10^3/UL (ref 130–400)
PMV BLD AUTO: 9.8 FL (ref 7.4–10.4)
RBC # BLD AUTO: 4.52 10^6/UL (ref 4.35–5.85)
WBC # BLD AUTO: 10 10^3/UL (ref 4.3–11)

## 2018-05-03 PROCEDURE — 85027 COMPLETE CBC AUTOMATED: CPT

## 2018-05-03 PROCEDURE — 84443 ASSAY THYROID STIM HORMONE: CPT

## 2018-05-03 PROCEDURE — 82306 VITAMIN D 25 HYDROXY: CPT

## 2018-05-03 PROCEDURE — 36415 COLL VENOUS BLD VENIPUNCTURE: CPT

## 2018-05-03 PROCEDURE — 77080 DXA BONE DENSITY AXIAL: CPT

## 2018-05-03 PROCEDURE — 83970 ASSAY OF PARATHORMONE: CPT

## 2018-05-03 NOTE — DIAGNOSTIC IMAGING REPORT
INDICATION: Osteoporosis.



FINDINGS: Bone mineral analysis of the lumbar spine and left hip

was performed. Bone mineral density in the lumbar spine L2-L4 is

1.302 with T-score 0.8. Bone mineral density of the left femoral

neck is 1.074 with T-score 0.3.



IMPRESSION: Normal bone mineral density of the lumbar spine and

left femoral neck.



Dictated by: 



  Dictated on workstation # [id number]

## 2018-08-09 ENCOUNTER — HOSPITAL ENCOUNTER (OUTPATIENT)
Dept: HOSPITAL 75 - LAB | Age: 63
End: 2018-08-09
Attending: PHYSICIAN ASSISTANT
Payer: MEDICARE

## 2018-08-09 DIAGNOSIS — E55.9: Primary | ICD-10-CM

## 2018-08-09 DIAGNOSIS — M81.0: ICD-10-CM

## 2018-08-09 DIAGNOSIS — M85.89: ICD-10-CM

## 2018-08-09 DIAGNOSIS — R53.83: ICD-10-CM

## 2018-08-09 PROCEDURE — 82306 VITAMIN D 25 HYDROXY: CPT

## 2018-08-09 PROCEDURE — 36415 COLL VENOUS BLD VENIPUNCTURE: CPT

## 2020-02-18 ENCOUNTER — HOSPITAL ENCOUNTER (OUTPATIENT)
Dept: HOSPITAL 75 - WOUNDCARE | Age: 65
End: 2020-02-18
Attending: ORTHOPAEDIC SURGERY
Payer: MEDICARE

## 2020-02-18 DIAGNOSIS — L97.528: Primary | ICD-10-CM

## 2020-02-18 DIAGNOSIS — Z72.0: ICD-10-CM

## 2020-02-18 DIAGNOSIS — I70.245: ICD-10-CM

## 2020-02-18 PROCEDURE — 99213 OFFICE O/P EST LOW 20 MIN: CPT

## 2020-02-20 ENCOUNTER — HOSPITAL ENCOUNTER (OUTPATIENT)
Dept: HOSPITAL 75 - RAD | Age: 65
End: 2020-02-20
Attending: ORTHOPAEDIC SURGERY
Payer: MEDICARE

## 2020-02-20 DIAGNOSIS — I70.245: Primary | ICD-10-CM

## 2020-02-20 DIAGNOSIS — Z72.0: ICD-10-CM

## 2020-02-20 DIAGNOSIS — L97.528: ICD-10-CM

## 2020-02-20 PROCEDURE — 93926 LOWER EXTREMITY STUDY: CPT

## 2020-02-20 NOTE — DIAGNOSTIC IMAGING REPORT
INDICATION: Ischemic ulcer left foot.



TECHNIQUE: Duplex ultrasound of the arterial system of the left

leg was done with grayscale, spectral waveform, and color Doppler

analysis.



FINDINGS: There is a monophasic wave pattern from the groin to

the ankles. There is diffuse calcific atherosclerosis. The

velocities are diminished throughout the leg. There is no

occlusion or focal stenosis detected.



IMPRESSION: Diffuse atherosclerosis. There are diminished

velocities and waveforms throughout the left leg suggesting there

may be aortic and/or left iliac artery stenosis.



Dictated by: 



  Dictated on workstation # [id number]

## 2020-02-25 ENCOUNTER — HOSPITAL ENCOUNTER (OUTPATIENT)
Dept: HOSPITAL 75 - WOUNDCARE | Age: 65
End: 2020-02-25
Attending: ORTHOPAEDIC SURGERY
Payer: MEDICARE

## 2020-02-25 DIAGNOSIS — I96: ICD-10-CM

## 2020-02-25 DIAGNOSIS — I70.245: ICD-10-CM

## 2020-02-25 DIAGNOSIS — L97.528: Primary | ICD-10-CM

## 2020-02-25 DIAGNOSIS — Z72.0: ICD-10-CM

## 2020-02-25 PROCEDURE — 99212 OFFICE O/P EST SF 10 MIN: CPT

## 2020-03-18 ENCOUNTER — HOSPITAL ENCOUNTER (OUTPATIENT)
Dept: HOSPITAL 75 - CATH | Age: 65
LOS: 1 days | Discharge: HOME | End: 2020-03-19
Attending: INTERNAL MEDICINE
Payer: MEDICARE

## 2020-03-18 VITALS — DIASTOLIC BLOOD PRESSURE: 76 MMHG | SYSTOLIC BLOOD PRESSURE: 118 MMHG

## 2020-03-18 VITALS — DIASTOLIC BLOOD PRESSURE: 56 MMHG | SYSTOLIC BLOOD PRESSURE: 97 MMHG

## 2020-03-18 VITALS — DIASTOLIC BLOOD PRESSURE: 55 MMHG | SYSTOLIC BLOOD PRESSURE: 101 MMHG

## 2020-03-18 VITALS — WEIGHT: 152.12 LBS | BODY MASS INDEX: 25.04 KG/M2 | HEIGHT: 65.35 IN

## 2020-03-18 VITALS — SYSTOLIC BLOOD PRESSURE: 104 MMHG | DIASTOLIC BLOOD PRESSURE: 49 MMHG

## 2020-03-18 VITALS — DIASTOLIC BLOOD PRESSURE: 66 MMHG | SYSTOLIC BLOOD PRESSURE: 134 MMHG

## 2020-03-18 VITALS — DIASTOLIC BLOOD PRESSURE: 61 MMHG | SYSTOLIC BLOOD PRESSURE: 115 MMHG

## 2020-03-18 VITALS — DIASTOLIC BLOOD PRESSURE: 55 MMHG | SYSTOLIC BLOOD PRESSURE: 104 MMHG

## 2020-03-18 VITALS — DIASTOLIC BLOOD PRESSURE: 53 MMHG | SYSTOLIC BLOOD PRESSURE: 103 MMHG

## 2020-03-18 VITALS — DIASTOLIC BLOOD PRESSURE: 61 MMHG | SYSTOLIC BLOOD PRESSURE: 103 MMHG

## 2020-03-18 VITALS — SYSTOLIC BLOOD PRESSURE: 109 MMHG | DIASTOLIC BLOOD PRESSURE: 59 MMHG

## 2020-03-18 VITALS — SYSTOLIC BLOOD PRESSURE: 104 MMHG | DIASTOLIC BLOOD PRESSURE: 59 MMHG

## 2020-03-18 VITALS — SYSTOLIC BLOOD PRESSURE: 99 MMHG | DIASTOLIC BLOOD PRESSURE: 59 MMHG

## 2020-03-18 VITALS — DIASTOLIC BLOOD PRESSURE: 64 MMHG | SYSTOLIC BLOOD PRESSURE: 110 MMHG

## 2020-03-18 DIAGNOSIS — Z95.1: ICD-10-CM

## 2020-03-18 DIAGNOSIS — Z90.89: ICD-10-CM

## 2020-03-18 DIAGNOSIS — I25.10: ICD-10-CM

## 2020-03-18 DIAGNOSIS — I99.8: ICD-10-CM

## 2020-03-18 DIAGNOSIS — I65.29: ICD-10-CM

## 2020-03-18 DIAGNOSIS — F17.210: ICD-10-CM

## 2020-03-18 DIAGNOSIS — Z79.899: ICD-10-CM

## 2020-03-18 DIAGNOSIS — I70.0: ICD-10-CM

## 2020-03-18 DIAGNOSIS — Z83.3: ICD-10-CM

## 2020-03-18 DIAGNOSIS — E78.5: ICD-10-CM

## 2020-03-18 DIAGNOSIS — I71.4: ICD-10-CM

## 2020-03-18 DIAGNOSIS — I10: ICD-10-CM

## 2020-03-18 DIAGNOSIS — M19.90: ICD-10-CM

## 2020-03-18 DIAGNOSIS — I70.92: Primary | ICD-10-CM

## 2020-03-18 DIAGNOSIS — I73.9: ICD-10-CM

## 2020-03-18 DIAGNOSIS — Z88.5: ICD-10-CM

## 2020-03-18 LAB
ALBUMIN SERPL-MCNC: 4.5 GM/DL (ref 3.2–4.5)
ALP SERPL-CCNC: 107 U/L (ref 40–136)
ALT SERPL-CCNC: 12 U/L (ref 0–55)
APTT BLD: 29 SEC (ref 24–35)
APTT PPP: YELLOW S
BACTERIA #/AREA URNS HPF: (no result) /HPF
BILIRUB SERPL-MCNC: 0.6 MG/DL (ref 0.1–1)
BILIRUB UR QL STRIP: NEGATIVE
BUN/CREAT SERPL: 10
CALCIUM SERPL-MCNC: 9.9 MG/DL (ref 8.5–10.1)
CHLORIDE SERPL-SCNC: 105 MMOL/L (ref 98–107)
CHOLEST SERPL-MCNC: 256 MG/DL (ref ?–200)
CO2 SERPL-SCNC: 27 MMOL/L (ref 21–32)
CREAT SERPL-MCNC: 0.71 MG/DL (ref 0.6–1.3)
ERYTHROCYTE [DISTWIDTH] IN BLOOD BY AUTOMATED COUNT: 14.7 % (ref 10–14.5)
FIBRINOGEN PPP-MCNC: CLEAR MG/DL
GFR SERPLBLD BASED ON 1.73 SQ M-ARVRAT: > 60 ML/MIN
GLUCOSE SERPL-MCNC: 90 MG/DL (ref 70–105)
GLUCOSE UR STRIP-MCNC: NEGATIVE MG/DL
HCT VFR BLD CALC: 54 % (ref 35–52)
HDLC SERPL-MCNC: 38 MG/DL (ref 40–60)
HGB BLD-MCNC: 17.9 G/DL (ref 11.5–16)
INR PPP: 0.9 (ref 0.8–1.4)
KETONES UR QL STRIP: NEGATIVE
LEUKOCYTE ESTERASE UR QL STRIP: (no result)
MCH RBC QN AUTO: 32 PG (ref 25–34)
MCHC RBC AUTO-ENTMCNC: 33 G/DL (ref 32–36)
MCV RBC AUTO: 98 FL (ref 80–99)
NITRITE UR QL STRIP: NEGATIVE
PH UR STRIP: 5.5 [PH] (ref 5–9)
PLATELET # BLD: 245 10^3/UL (ref 130–400)
PMV BLD AUTO: 10.3 FL (ref 7.4–10.4)
POTASSIUM SERPL-SCNC: 4.1 MMOL/L (ref 3.6–5)
PROT SERPL-MCNC: 8.5 GM/DL (ref 6.4–8.2)
PROT UR QL STRIP: NEGATIVE
PROTHROMBIN TIME: 12.7 SEC (ref 12.2–14.7)
RBC #/AREA URNS HPF: (no result) /HPF
SODIUM SERPL-SCNC: 143 MMOL/L (ref 135–145)
SP GR UR STRIP: 1.02 (ref 1.02–1.02)
TRIGL SERPL-MCNC: 301 MG/DL (ref ?–150)
VLDLC SERPL CALC-MCNC: 60 MG/DL (ref 5–40)
WBC # BLD AUTO: 9.9 10^3/UL (ref 4.3–11)
WBC #/AREA URNS HPF: (no result) /HPF
YEAST #/AREA URNS HPF: (no result) /HPF

## 2020-03-18 PROCEDURE — 87088 URINE BACTERIA CULTURE: CPT

## 2020-03-18 PROCEDURE — 81000 URINALYSIS NONAUTO W/SCOPE: CPT

## 2020-03-18 PROCEDURE — 93005 ELECTROCARDIOGRAM TRACING: CPT

## 2020-03-18 PROCEDURE — 80061 LIPID PANEL: CPT

## 2020-03-18 PROCEDURE — 36248 INS CATH ABD/L-EXT ART ADDL: CPT

## 2020-03-18 PROCEDURE — 85027 COMPLETE CBC AUTOMATED: CPT

## 2020-03-18 PROCEDURE — 36415 COLL VENOUS BLD VENIPUNCTURE: CPT

## 2020-03-18 PROCEDURE — 87081 CULTURE SCREEN ONLY: CPT

## 2020-03-18 PROCEDURE — 37220: CPT

## 2020-03-18 PROCEDURE — 85610 PROTHROMBIN TIME: CPT

## 2020-03-18 PROCEDURE — 80053 COMPREHEN METABOLIC PANEL: CPT

## 2020-03-18 PROCEDURE — 80048 BASIC METABOLIC PNL TOTAL CA: CPT

## 2020-03-18 PROCEDURE — 71045 X-RAY EXAM CHEST 1 VIEW: CPT

## 2020-03-18 PROCEDURE — 75716 ARTERY X-RAYS ARMS/LEGS: CPT

## 2020-03-18 PROCEDURE — 85730 THROMBOPLASTIN TIME PARTIAL: CPT

## 2020-03-18 RX ADMIN — SODIUM CHLORIDE SCH MLS/HR: 900 INJECTION, SOLUTION INTRAVENOUS at 23:37

## 2020-03-18 RX ADMIN — OMEGA-3 FATTY ACIDS CAP 1000 MG SCH MG: 1000 CAP at 17:28

## 2020-03-18 RX ADMIN — SODIUM CHLORIDE SCH MLS/HR: 900 INJECTION, SOLUTION INTRAVENOUS at 15:33

## 2020-03-18 NOTE — PERIPHERAL REPORT
Peripheral Report


Physician (s)/Assistant (s)


Physician


THERON CENTENO MD





Pre-Procedure Diagnosis


Pre-Procedure Diagnosis:  Critical limb ischemia





Post-Procedure Note


Procedure Start Date:  Mar 18, 2020


Name of Procedure:  


Abdominal aortogram


Bilateral runoff


Additional imaging


Stenting to the left iliac artery


Balloon angioplasty to the right iliac artery


Findings/Procedure Note


PROCEDURE NOTE:


64-year-old lady with history of peripheral arterial disease, had bilateral k

issing stents in the iliac arteries, noncompliant with medication or 

appointment, return to my office yesterday with pain in her left leg and 

gangrene in her left fifth toe.  She was scheduled for peripheral angiogram.


After explaining the procedure to the patient, all pros and cons were explained,

all questions were answered.  The patient signed the consent and then she was 

placed on the cardiac catheterization laboratory.


Sheath was placed in the right femoral artery, angiogram done to evaluate the 

lower abdominal aorta and bifurcation showing total occlusion to the left iliac 

artery.


Patient was given 5000 units of heparin and additional 2000 units of heparin at 

a later point.  Intervention was done and it was fairly complex.


Attempted to cross over without success in crossing the lesion using multiple 

different guiding catheters without any success there was a dissection around 

the stent that is at the ostial left iliac.


I was able to access the left femoral artery after multiple attempts then had 

significant difficulty crossing the total occlusion.  I used multiple different 

wires, I was successful with the 0.35 Glidewire, using the support of a short 

straight catheter, the the wires I used were Storq, J-wire, 0.18 ST, connected 

250T, after crossing I did multiple balloon angioplasty and there was 

significant resistance and recoiling.  Then I did kissing stent to both stents 

using 6 x 20 [place] balloon.  Pressure continued to be low post intervention and

there was significant recoiling, I proceeded with deployment of a new Omnilink 

stent using 6 x 29 post dilated at the overlap area the 6.7 and lower segment to

6.5.  Angiogram showed excellent results.  Then I proceeded with runoff to the 

left leg, use multiple abstracted images to the trifurcation, mid level of the 

leg and to the foot level.


Pigtail catheter was advanced from the right sheath and angiogram was done 

showing excellent results.


At the end of the procedure before sheath were removed and closure devise used


FINDINGS:


 Abdominal aorta showed small aneurysmal dilatation, diffuse atherosclerosis in 

the lower abdominal aorta, diffuse disease


Right leg, angiogram showed patent stent at the right iliac, kissing stent was 

done in the past, 15 balloon angioplasty was done to protect the stent to the 

right iliac artery, good flow was noted.


Left leg, total occlusion of the left iliac stent, complex intervention with 

multiple balloon angioplasty and deployment Omnilink 6 x 29 expanded proximally 

to 6.7 and distally 6.5 with excellent results brisk flow was noted, moderate 

disease at the mid to distal anterior tibial artery, good flow overall with 

brisk flow down to the toes


 


CONCLUSIONS:


1.  Total occlusion of the left iliac stent, complex intervention with balloon 

and 2+ at multiple segment then deployment of Omnilink 6 x 29 overlapping with 

the old stent with excellent results


2.  Moderate disease at the left anterior tibial artery, brisk flow post 

intervention down to the foot


3.  Patent right iliac stent, kissing balloon angioplasty was done to protect 

the stent during aggressive intervention on the left iliac stent


4.  Atherosclerotic disease in the abdominal aorta and small aneurysmal 

dilatation infrarenal lesion just above the bifurcation





DISCUSSION AND RECOMMENDATIONS:


 Maximize medical therapy.  Educated on compliance


Anesthesia Type:  Conscious Sedation


Estimated blood loss (mL):  50 ml


Contrast Amount:  109 ml


Total Radiation Dose:  531 mGy





Post-Procedure Diagnosis


Post-operative diagnosis:  


Critical limb ischemia


Peripheral arterial disease


Hypertension


Hyperlipidemia











THERON CENTENO MD              Mar 18, 2020 13:17

## 2020-03-18 NOTE — DIAGNOSTIC IMAGING REPORT
INDICATION: Hypertension and tobaccoism.



Time of exam: 10:01 AM



Correlation is made with prior chest from 02/21/2018.



Changes of median sternotomy and CABG are noted. There has been

some improved aeration to the left base since prior chest

radiograph. Some minimal interstitial changes in the left base,

perhaps scarring. No parenchymal consolidation is seen. There is

no effusion or pneumothorax. Postoperative changes lower cervical

spine are noted.



IMPRESSION: Improved aeration left base since prior radiograph.

No acute feature is seen.



Dictated by: 



  Dictated on workstation # [id number]

## 2020-03-18 NOTE — CARDIAC PROCEDURE NOTE-CS/ASA
Pre-Procedure Note


Pre-Op Procedure Note


H&P Reviewed


The H&P was reviewed, patient examined and no changes noted.


Date H&P Reviewed:  Mar 18, 2020


Time H&P Reviewed:  10:02





Conscious Sedation Pre-Proced


Time


10:02





ASA Score


3


For ASA 3 and 4: Consider anesthesia and medical clearance. Also, for patients

with a history of failed moderate sedation consider anesthesia.

















Airway 


 


Lungs 


 


Heart 


 


 ASA score


 


 ASA 1: a normal healthy patient


 


 ASA 2:  a patient with a mild systemic disease (mid diabetes, controlled 

hypertension, obesity 


 


 ASA 3:  a patient with a severe systemic disease that limits activity  (angina,

COPD, prior Myocardial infarction)


 


 ASA 4:  a patient with an incapacitating disease that is a constant threat to 

life (CHF, renal failure)


 


 ASA 5:  a moribund patient not expected to survive 24 hrs.  (ruptured aneurysm)


 


 ASA 6:  a declared brain-dead patient whose organs are being harvested.


 


 For emergent operations, add the letter E after the classification











Mallampati Classification


Grade 3





Sedation Plan


Analgesia, Amnesia, Plan communicated to team members, Discussed options with 

patient/fam, Discussed risks with patient/fam


The patient is an appropriate candidate to undergo the planned procedure, 

sedation, and anesthesia.





The patient immediately re-assessed prior to indication.











THERON CENTENO MD              Mar 18, 2020 10:02

## 2020-03-19 VITALS — SYSTOLIC BLOOD PRESSURE: 97 MMHG | DIASTOLIC BLOOD PRESSURE: 55 MMHG

## 2020-03-19 VITALS — SYSTOLIC BLOOD PRESSURE: 92 MMHG | DIASTOLIC BLOOD PRESSURE: 53 MMHG

## 2020-03-19 VITALS — DIASTOLIC BLOOD PRESSURE: 46 MMHG | SYSTOLIC BLOOD PRESSURE: 90 MMHG

## 2020-03-19 LAB
BUN/CREAT SERPL: 19
CALCIUM SERPL-MCNC: 8.5 MG/DL (ref 8.5–10.1)
CHLORIDE SERPL-SCNC: 105 MMOL/L (ref 98–107)
CO2 SERPL-SCNC: 22 MMOL/L (ref 21–32)
CREAT SERPL-MCNC: 0.64 MG/DL (ref 0.6–1.3)
ERYTHROCYTE [DISTWIDTH] IN BLOOD BY AUTOMATED COUNT: 14.5 % (ref 10–14.5)
GFR SERPLBLD BASED ON 1.73 SQ M-ARVRAT: > 60 ML/MIN
GLUCOSE SERPL-MCNC: 99 MG/DL (ref 70–105)
HCT VFR BLD CALC: 44 % (ref 35–52)
HGB BLD-MCNC: 14.4 G/DL (ref 11.5–16)
MCH RBC QN AUTO: 32 PG (ref 25–34)
MCHC RBC AUTO-ENTMCNC: 33 G/DL (ref 32–36)
MCV RBC AUTO: 98 FL (ref 80–99)
PLATELET # BLD: 209 10^3/UL (ref 130–400)
PMV BLD AUTO: 10.1 FL (ref 7.4–10.4)
POTASSIUM SERPL-SCNC: 3.7 MMOL/L (ref 3.6–5)
SODIUM SERPL-SCNC: 138 MMOL/L (ref 135–145)
WBC # BLD AUTO: 11 10^3/UL (ref 4.3–11)

## 2020-03-19 RX ADMIN — OMEGA-3 FATTY ACIDS CAP 1000 MG SCH MG: 1000 CAP at 05:38

## 2020-03-19 NOTE — CARDIOLOGY PROGRESS NOTE
Subjective


Date Seen by Provider:  Mar 19, 2020


Time Seen by Provider:  09:18


Subjective/Events-last exam


Patient is laying down in bed, feeling better, still having left hip pain.  

Palpable dorsalis pedis pulse on the left foot


Review of Systems


General:  No Chills, No Night Sweats, No Fatigue, No Malaise, No Appetite, No 

Other


HEENT:  No Head Aches, No Visual Changes, No Eye Pain, No Ear Pain, No 

Dysphasia, No Sinus Congestion, No Post Nasal Drip, No Sore Throat, No Other


Pulmonary:  No Dyspnea, No Cough, No Pleuritic Chest Pain, No Other


Cardiovascular:  No: Chest Pain, Palpitations, Orthopnea, Paroxysmal Noc. 

Dyspnea, Edema, Lt Headedness, Other





Objective-Cardiology


Exam


Last Set of Vital Signs





Vital Signs




















Capillary Refill : Less Than 3 Seconds


I&O











Intake and Output 


 


 3/19/20





 00:00


 


Intake Total 1350 ml


 


Output Total 300 ml


 


Balance 1050 ml


 


 


 


Intake Oral 350 ml


 


IV Total 1000 ml


 


Output Urine Total 300 ml








General:  Alert, Oriented X3, Cooperative


HEENT:  Atraumatic, PERRLA


Neck:  Supple, No JVD, No Thyromegaly


Lungs:  Clear to Auscultation, Normal Air Movement


Heart:  Regular Rate, Normal S1, Normal S2, No Murmurs


Abdomen:  Normal Bowel Sounds, Soft, No Tenderness, No Hepatosplenomegaly, No 

Masses


Extremities:  No Clubbing, No Cyanosis, No Edema, Normal Pulses, No 

Tenderness/Swelling


Skin:  No Rashes, No Breakdown, No Significant Lesion


Neuro:  Normal Gait, Normal Speech, Strength at 5/5 X4 Ext, Normal Tone, 

Sensation Intact


Psych/Mental Status:  Mental Status NL, Mood NL





Results


Lab


Laboratory Tests


3/18/20 10:00








3/19/20 03:02














A/P-Cardiology


Admission Diagnosis


Peripheral arterial disease


Critical limb ischemia


Hyperlipidemia


Tobaccoism





Assessment/Plan


Critical limb ischemia with gangrene of the left fifth toe, status post complex 

intervention and stenting of the left iliac artery, kissing balloon angioplasty 

to the iliacs.  Has good palpable left femoral pulse at this time, dorsalis 

pedis pulse is diminished but palpable.  Her foot is warm.





Hyperlipidemia, started on statin and educated on compliance with medication





Tobaccoism educated on smoking cessation





Clinical Quality Measures


DVT/VTE Risk/Contraindication:


Risk Factor Score Per Nursin


RFS Level Per Nursing on Admit:  2=Moderate











THERON CENTENO MD              Mar 19, 2020 09:20

## 2020-03-19 NOTE — DISCHARGE INST-POST CATH
Discharge Inst-CATH/EP


Problems Reviewed?:  Yes


Post Cardiac Cath/EP D/C Inst


Follow Up/Plan


Appointment with Dr. Vergara's office in 4 weeks


<b>CARDIAC CATH/EP PROCEDURE DISCHARGE INSTRUCTIONS</b>





ACTIVITY





* Go Home directly and rest.


* Limit activity of the leg (or wrist if it was used) for 7 days including 

aerobics, swimming,


   jogging, bicycling, etc.


* Restrict stair-climbing for 7 days if possible, if not, climb up with your 

non-cath leg, then


   bring together on the same step.


* Avoid lifting, pushing, pulling or excessive movement of the affected 

extremity for 7 days.


* Customary sexual activity may be resumed after 2 days-use caution not to use a

position  


   that strains or causes pain to the affected extremity.


* No driving for 24 hours.


* NO SMOKING. 


* Avoid straining for bowel movements for 7 days.


* Gentle walking on level ground is allowed.


* Returning to work will depend on the type of procedure and the results. Your 

doctor will discuss


   this with you.





CALL YOUR DOCTOR FOR ANY OF THE FOLLOWING:





*If bleeding from the puncture site occurs- Apply gentle pressure to site with 

clean cloth and call


   your doctor or EMS.


* If a knot or lump forms under the skin, increases in size, or causes pain.


* If bruising appears to be worsening or moving further down your leg instead of

disappearing.


* Temperature above 101 F.





CARE OF YOUR GROIN INCISION;





* Bruising or purple discoloration of the skin near the puncture site is common.


* You may shower only, no bathtub bathing for 5 days.  Be careful to avoid 

slipping as your


   leg may feel stiff.


* If a closure device was used on your femoral artery, please see the attached 

guide regarding


   care of the device and your leg.


* Leave dressing on FOR 24 hours.





CARE OF YOUR WRIST INCISION;





* Bruising or purple discoloration of the skin near the puncture site is common.


* You may shower.


* DO NOT submerge wrist.


* Leave dressing on FOR 24 hours.











THERON VERGARA MD              Mar 19, 2020 09:20

## 2022-08-10 ENCOUNTER — HOSPITAL ENCOUNTER (OUTPATIENT)
Dept: HOSPITAL 75 - CATH | Age: 67
LOS: 1 days | Discharge: HOME | End: 2022-08-11
Attending: INTERNAL MEDICINE
Payer: MEDICARE

## 2022-08-10 ENCOUNTER — HOSPITAL ENCOUNTER (OUTPATIENT)
Dept: HOSPITAL 75 - LAB | Age: 67
End: 2022-08-10
Attending: PHYSICIAN ASSISTANT
Payer: MEDICARE

## 2022-08-10 VITALS — DIASTOLIC BLOOD PRESSURE: 77 MMHG | SYSTOLIC BLOOD PRESSURE: 132 MMHG

## 2022-08-10 VITALS — DIASTOLIC BLOOD PRESSURE: 60 MMHG | SYSTOLIC BLOOD PRESSURE: 117 MMHG

## 2022-08-10 VITALS — HEIGHT: 63.39 IN | BODY MASS INDEX: 27.04 KG/M2 | WEIGHT: 154.54 LBS

## 2022-08-10 VITALS — DIASTOLIC BLOOD PRESSURE: 95 MMHG | SYSTOLIC BLOOD PRESSURE: 136 MMHG

## 2022-08-10 VITALS — DIASTOLIC BLOOD PRESSURE: 66 MMHG | SYSTOLIC BLOOD PRESSURE: 129 MMHG

## 2022-08-10 VITALS — DIASTOLIC BLOOD PRESSURE: 61 MMHG | SYSTOLIC BLOOD PRESSURE: 137 MMHG

## 2022-08-10 VITALS — SYSTOLIC BLOOD PRESSURE: 103 MMHG | DIASTOLIC BLOOD PRESSURE: 56 MMHG

## 2022-08-10 VITALS — SYSTOLIC BLOOD PRESSURE: 123 MMHG | DIASTOLIC BLOOD PRESSURE: 58 MMHG

## 2022-08-10 VITALS — DIASTOLIC BLOOD PRESSURE: 56 MMHG | SYSTOLIC BLOOD PRESSURE: 82 MMHG

## 2022-08-10 VITALS — DIASTOLIC BLOOD PRESSURE: 73 MMHG | SYSTOLIC BLOOD PRESSURE: 117 MMHG

## 2022-08-10 VITALS — SYSTOLIC BLOOD PRESSURE: 134 MMHG | DIASTOLIC BLOOD PRESSURE: 84 MMHG

## 2022-08-10 DIAGNOSIS — K21.9: ICD-10-CM

## 2022-08-10 DIAGNOSIS — I25.10: ICD-10-CM

## 2022-08-10 DIAGNOSIS — E78.5: ICD-10-CM

## 2022-08-10 DIAGNOSIS — I65.29: ICD-10-CM

## 2022-08-10 DIAGNOSIS — E78.2: ICD-10-CM

## 2022-08-10 DIAGNOSIS — I73.9: ICD-10-CM

## 2022-08-10 DIAGNOSIS — I70.213: Primary | ICD-10-CM

## 2022-08-10 DIAGNOSIS — I10: Primary | ICD-10-CM

## 2022-08-10 LAB
ALBUMIN SERPL-MCNC: 4 GM/DL (ref 3.2–4.5)
ALBUMIN SERPL-MCNC: 4 GM/DL (ref 3.2–4.5)
ALP SERPL-CCNC: 86 U/L (ref 40–136)
ALP SERPL-CCNC: 86 U/L (ref 40–136)
ALT SERPL-CCNC: 12 U/L (ref 0–55)
ALT SERPL-CCNC: 12 U/L (ref 0–55)
APTT BLD: 30 SEC (ref 24–35)
APTT PPP: YELLOW S
BACTERIA #/AREA URNS HPF: (no result) /HPF
BILIRUB SERPL-MCNC: 0.5 MG/DL (ref 0.1–1)
BILIRUB SERPL-MCNC: 0.5 MG/DL (ref 0.1–1)
BILIRUB UR QL STRIP: NEGATIVE
BUN/CREAT SERPL: 13
BUN/CREAT SERPL: 13
CALCIUM SERPL-MCNC: 9.4 MG/DL (ref 8.5–10.1)
CALCIUM SERPL-MCNC: 9.4 MG/DL (ref 8.5–10.1)
CHLORIDE SERPL-SCNC: 106 MMOL/L (ref 98–107)
CHLORIDE SERPL-SCNC: 106 MMOL/L (ref 98–107)
CHOLEST SERPL-MCNC: 247 MG/DL (ref ?–200)
CHOLEST SERPL-MCNC: 247 MG/DL (ref ?–200)
CO2 SERPL-SCNC: 24 MMOL/L (ref 21–32)
CO2 SERPL-SCNC: 24 MMOL/L (ref 21–32)
CREAT SERPL-MCNC: 0.68 MG/DL (ref 0.6–1.3)
CREAT SERPL-MCNC: 0.68 MG/DL (ref 0.6–1.3)
FIBRINOGEN PPP-MCNC: (no result) MG/DL
GFR SERPLBLD BASED ON 1.73 SQ M-ARVRAT: 97 ML/MIN
GFR SERPLBLD BASED ON 1.73 SQ M-ARVRAT: 97 ML/MIN
GLUCOSE SERPL-MCNC: 97 MG/DL (ref 70–105)
GLUCOSE SERPL-MCNC: 97 MG/DL (ref 70–105)
GLUCOSE UR STRIP-MCNC: NEGATIVE MG/DL
HCT VFR BLD CALC: 52 % (ref 35–52)
HDLC SERPL-MCNC: 40 MG/DL (ref 40–60)
HDLC SERPL-MCNC: 40 MG/DL (ref 40–60)
HGB BLD-MCNC: 17.3 G/DL (ref 11.5–16)
INR PPP: 0.9 (ref 0.8–1.4)
KETONES UR QL STRIP: NEGATIVE
LEUKOCYTE ESTERASE UR QL STRIP: (no result)
MCH RBC QN AUTO: 33 PG (ref 25–34)
MCHC RBC AUTO-ENTMCNC: 34 G/DL (ref 32–36)
MCV RBC AUTO: 98 FL (ref 80–99)
NITRITE UR QL STRIP: POSITIVE
PH UR STRIP: 6 [PH] (ref 5–9)
PLATELET # BLD: 231 10^3/UL (ref 130–400)
PMV BLD AUTO: 10.5 FL (ref 9–12.2)
POTASSIUM SERPL-SCNC: 4.1 MMOL/L (ref 3.6–5)
POTASSIUM SERPL-SCNC: 4.1 MMOL/L (ref 3.6–5)
PROT SERPL-MCNC: 7.5 GM/DL (ref 6.4–8.2)
PROT SERPL-MCNC: 7.5 GM/DL (ref 6.4–8.2)
PROT UR QL STRIP: NEGATIVE
PROTHROMBIN TIME: 12.9 SEC (ref 12.2–14.7)
RBC #/AREA URNS HPF: (no result) /HPF
SODIUM SERPL-SCNC: 141 MMOL/L (ref 135–145)
SODIUM SERPL-SCNC: 141 MMOL/L (ref 135–145)
SP GR UR STRIP: 1.01 (ref 1.02–1.02)
SQUAMOUS #/AREA URNS HPF: (no result) /HPF
TRIGL SERPL-MCNC: 261 MG/DL (ref ?–150)
TRIGL SERPL-MCNC: 261 MG/DL (ref ?–150)
VLDLC SERPL CALC-MCNC: 52 MG/DL (ref 5–40)
VLDLC SERPL CALC-MCNC: 52 MG/DL (ref 5–40)
WBC # BLD AUTO: 8.4 10^3/UL (ref 4.3–11)
WBC #/AREA URNS HPF: >100 /HPF

## 2022-08-10 PROCEDURE — 87077 CULTURE AEROBIC IDENTIFY: CPT

## 2022-08-10 PROCEDURE — 36415 COLL VENOUS BLD VENIPUNCTURE: CPT

## 2022-08-10 PROCEDURE — 71045 X-RAY EXAM CHEST 1 VIEW: CPT

## 2022-08-10 PROCEDURE — 80053 COMPREHEN METABOLIC PANEL: CPT

## 2022-08-10 PROCEDURE — 80061 LIPID PANEL: CPT

## 2022-08-10 PROCEDURE — 93005 ELECTROCARDIOGRAM TRACING: CPT

## 2022-08-10 PROCEDURE — 37220: CPT

## 2022-08-10 PROCEDURE — 85027 COMPLETE CBC AUTOMATED: CPT

## 2022-08-10 PROCEDURE — 85610 PROTHROMBIN TIME: CPT

## 2022-08-10 PROCEDURE — 75716 ARTERY X-RAYS ARMS/LEGS: CPT

## 2022-08-10 PROCEDURE — 85730 THROMBOPLASTIN TIME PARTIAL: CPT

## 2022-08-10 PROCEDURE — 87081 CULTURE SCREEN ONLY: CPT

## 2022-08-10 PROCEDURE — 87186 SC STD MICRODIL/AGAR DIL: CPT

## 2022-08-10 PROCEDURE — 37222: CPT

## 2022-08-10 PROCEDURE — 81000 URINALYSIS NONAUTO W/SCOPE: CPT

## 2022-08-10 PROCEDURE — 87088 URINE BACTERIA CULTURE: CPT

## 2022-08-10 RX ADMIN — SODIUM CHLORIDE SCH MLS/HR: 900 INJECTION, SOLUTION INTRAVENOUS at 13:22

## 2022-08-10 RX ADMIN — SODIUM CHLORIDE SCH MLS/HR: 900 INJECTION, SOLUTION INTRAVENOUS at 21:49

## 2022-08-10 NOTE — DIAGNOSTIC IMAGING REPORT
INDICATION: Pre-heart catheterization.



TIME OF EXAM: 9:24 AM



Correlation is made with prior chest from 03/18/2020.



FINDINGS: Changes of median sternotomy and CABG are noted. Heart

size is stable. There are some mild basilar interstitial changes

which may be chronic. No infiltrate, effusion or pneumothorax is

detected. There are postoperative changes lower cervical spine.



IMPRESSION: Chronic changes. No acute intracranial process is

detected.



Dictated by: 



  Dictated on workstation # [id number]

## 2022-08-10 NOTE — PROGRESS NOTE
Standard Progress Note


Progress Notes/Assess & Plan


Date Seen by a Provider:  Aug 10, 2022


Time Seen by a Provider:  13:52


Progress/Assessment & Plan


Has UTI with > 100 WBC/HPF and + nitrates, no rise in WBC, afebrile


will give po Bactrim


will get urine culture when no longer lying flat











CAROLINA BOOTHE MD             Aug 10, 2022 13:56

## 2022-08-10 NOTE — PERIPHERAL REPORT
Peripheral Report


Physician (s)/Assistant (s)


Physician


THERON CENTENO MD





Pre-Procedure Diagnosis


Pre-Procedure Diagnosis:  Peripheral arterial disease, coronary artery disease





Post-Procedure Note


Procedure Start Date:  Aug 10, 2022


Name of Procedure:  


Bilateral lower extremity runoff


Third order


Bilateral iliac kissing balloon angioplasty


Findings/Procedure Note


PROCEDURE NOTE:


66-year-old lady with history of peripheral arterial disease, has been having 

increasing claudication, had abnormal ABBY, scheduled for peripheral angiogram.


After explaining the procedure to the patient, all pros and cons were explained,

all questions were answered.  The patient signed the consent and then she was 

placed on the cardiac catheterization laboratory.





The patient was placed on the cardiac catheterization laboratory. Groin was 

prepped SL fashion local anesthesia was used. Sheath placed in the right femoral

artery, I advanced a pigtail catheter and did runoff to the bifurcation.  

Patient has total occlusion.  I was unable to access the left femoral artery.  I

attempted with IM curved guide and rim catheter with the command 18 without 

success then I used UF diagnostic catheter and used it to guide the wire and I 

was able to advance the wire command 18 to the common femoral artery then I 

position the UF catheter at a better position and retrieved the command 18 and 

used a Glidewire advanced into the mid SFA then exchanged the catheter to a 

straight catheter advanced it to the proximal SFA and did runoff establish that 

I was in the true lumen.  Then placed a Storq wire and did balloon angioplasty 

using 4.0 [place] balloon then I was able to access the left femoral artery and 

placed a 6 [place] sheath at the left femoral artery then I advanced 6.0 x 80 

[place] and balloon the left femoral and common iliac artery that I did kissing 

balloon using 6.0 balloon bilaterally angiogram showed some residual stenosis at

the ostium of the left common iliac advanced the 6 balloon and did another 

inflation I was satisfied with the results.


I checked the aortic pressure and the femoral artery pressure in both sheath and

it was the same.


Bilateral sheath were removed and closure device deployed





FINDINGS:


Total occlusion at the ostium of the left common iliac artery within the stent, 

complex intervention as described above then balloon angioplasty with kissing 

balloon to both iliac arteries, runoff to both legs showed excellent results.  

Pressure was equal in the both femoral artery post intervention.


 


CONCLUSIONS:


Successful balloon angioplasty with kissing balloon for bilateral iliac stent, 

started with total occlusion of the left iliac stent, total resolution of the 

occlusion with excellent results.





DISCUSSION AND RECOMMENDATIONS:


Continue to maximize medical therapy


Anesthesia Type:  Conscious Sedation


Estimated blood loss (mL):  30 ml


Contrast Amount:  70 ml


Total Radiation Dose:  677 mGy





Post-Procedure Diagnosis


Post-operative diagnosis:  


Claudication


Peripheral arterial disease


Hyperlipidemia











THERON CENTENO MD              Aug 10, 2022 12:25

## 2022-08-10 NOTE — TELE-ICU CONSULT
History of Present Illness


History of Present Illness


Date Seen by Provider:  Aug 10, 2022


Time Seen by Provider:  13:36


History of Present Illness


67 yo F with increasing claudications, PVD, HLD, went to [facility] for placement of 

bilateral iliac artery stents with good results





Allergies and Home Medications


Allergies


Coded Allergies:  


     Statins-HMG-CoA Reductase Inhibitor (Verified  Allergy, Severe, 8/10/22)


     codeine (Unverified  Adverse Reaction, Mild, N/V, 2/20/18)





Home Medications


Aspirin 81 Mg Tablet.dr, 81 MG PO DAILY, (Reported)


Carvedilol 6.25 Mg Tablet, 6.25 MG PO HS, (Reported)





Past Medical/Social/Family Hx


Immunizations Up To Date


Tetanus Booster (TDap):  Unknown


Date of Pneumonia Vaccine:  Oct 1, 2013





Current Status


Communicates:  Verbally


Primary Language:  English





Review of Systems


Constitutional:  see HPI


EENTM:  see HPI


Respiratory:  see HPI


Cardiovascular:  see HPI


Gastrointestinal:  see HPI


Genitourinary:  see HPI


Musculoskeletal:  see HPI


Skin:  see HPI


Psychiatric/Neurological:  See HPI





Focused Exam


Height, Weight, BMI


Height: 5'5.00"


Weight: 160lbs. 0.0oz. 72.827648iw; 23.50 BMI


Method:Stated





Exam


Exam


Patient acknowledged, consented, and participated in this virtual visit which 

was conducted using real time audio/video


Vital Signs








  Date Time  Temp Pulse Resp B/P (MAP) Pulse Ox O2 Delivery O2 Flow Rate FiO2


 


8/10/22 13:15  93 15 137/61 (86) 96 Room Air  


 


8/10/22 13:00  97 17 132/77 (95) 92 Room Air  


 


8/10/22 12:53  105      


 


8/10/22 12:45  103  136/95 (109)  Room Air  


 


8/10/22 09:14 35.9 79 20 134/84 (101) 93 Room Air  








Height & Weight


Height: 5'5.00"


Weight: 160lbs. 0.0oz. 72.743267hi; 23.50 BMI


Method:Stated


General Appearance:  No Apparent Distress


Respiratory:  Lungs Clear


Gastrointestinal:  normal bowel sounds, non tender, soft


Neurologic/Psychiatric:  Alert, Oriented x3





Results


Lab


Laboratory Tests


8/10/22 08:45








8/10/22 08:50











Assessment/Plan


Assessment/Plan


successful placement of bilateral iliac artery stents


Critical Care:  Critically Ill Patient


Time spent with patient (mins):  20











CAROLINA BOOTHE MD             Aug 10, 2022 13:38

## 2022-08-10 NOTE — CARDIAC PROCEDURE NOTE-CS/ASA
Pre-Procedure Note


Pre-Op Procedure Note


Date of Available H&P:  Jul 19, 2022


Date H&P Reviewed:  Aug 10, 2022


Time H&P Reviewed:  10:00


History & Physical:  H&P Reviewed, Patient Examed, No changes noted


Pre-Operative Diagnosis:  Peripheral arterial disease, coronary artery disease





Conscious Sedation Pre-Proced


Time


10:00





ASA Score


3


For ASA 3 and 4: Consider anesthesia and medical clearance. Also, for patients

with a history of failed moderate sedation consider anesthesia.

















Airway 


 


Lungs 


 


Heart 


 


 ASA score


 


 ASA 1: a normal healthy patient


 


 ASA 2:  a patient with a mild systemic disease (mid diabetes, controlled 

hypertension, obesity 


 


x ASA 3:  a patient with a severe systemic disease that limits activity  

(angina, COPD, prior Myocardial infarction)


 


 ASA 4:  a patient with an incapacitating disease that is a constant threat to 

life (CHF, renal failure)


 


 ASA 5:  a moribund patient not expected to survive 24 hrs.  (ruptured aneurysm)


 


 ASA 6:  a declared brain-dead patient whose organs are being harvested.


 


 For emergent operations, add the letter E after the classification











Mallampati Classification


Grade 3





Sedation Plan


Analgesia, Amnesia, Plan communicated to team members, Discussed options with 

patient/fam, Discussed risks with patient/fam


The patient is an appropriate candidate to undergo the planned procedure, 

sedation, and anesthesia.





The patient immediately re-assessed prior to indication.











THERON CENTENO MD              Aug 10, 2022 10:00

## 2022-08-11 VITALS — DIASTOLIC BLOOD PRESSURE: 48 MMHG | SYSTOLIC BLOOD PRESSURE: 95 MMHG

## 2022-08-11 VITALS — SYSTOLIC BLOOD PRESSURE: 127 MMHG | DIASTOLIC BLOOD PRESSURE: 79 MMHG

## 2022-08-11 VITALS — SYSTOLIC BLOOD PRESSURE: 108 MMHG | DIASTOLIC BLOOD PRESSURE: 52 MMHG

## 2022-08-11 NOTE — DISCHARGE INST-POST CATH
Discharge Inst-CATH/EP


Problems Reviewed?:  Yes


Post Cardiac Cath/EP D/C Inst


Follow Up/Plan


Appointment with Dr Vergara in 1-2 weeks


<b>CARDIAC CATH/EP PROCEDURE DISCHARGE INSTRUCTIONS</b>





ACTIVITY





* Go Home directly and rest.


* Limit activity of the leg (or wrist if it was used) for 7 days including 

aerobics, swimming,


   jogging, bicycling, etc.


* Restrict stair-climbing for 7 days if possible, if not, climb up with your 

non-cath leg, then


   bring together on the same step.


* Avoid lifting, pushing, pulling or excessive movement of the affected 

extremity for 7 days.


* Customary sexual activity may be resumed after 2 days-use caution not to use a

position  


   that strains or causes pain to the affected extremity.


* No driving for 24 hours.


* NO SMOKING. 


* Avoid straining for bowel movements for 7 days.


* Gentle walking on level ground is allowed.


* Returning to work will depend on the type of procedure and the results. Your 

doctor will discuss


   this with you.





CALL YOUR DOCTOR FOR ANY OF THE FOLLOWING:





*If bleeding from the puncture site occurs- Apply gentle pressure to site with 

clean cloth and call


   your doctor or EMS.


* If a knot or lump forms under the skin, increases in size, or causes pain.


* If bruising appears to be worsening or moving further down your leg instead of

disappearing.


* Temperature above 101 F.





CARE OF YOUR GROIN INCISION;





* Bruising or purple discoloration of the skin near the puncture site is common.


* You may shower only, no bathtub bathing for 5 days.  Be careful to avoid 

slipping as your


   leg may feel stiff.


* If a closure device was used on your femoral artery, please see the attached 

guide regarding


   care of the device and your leg.


* Leave dressing on FOR 24 hours.





CARE OF YOUR WRIST INCISION;





* Bruising or purple discoloration of the skin near the puncture site is common.


* You may shower.


* DO NOT submerge wrist.


* Leave dressing on FOR 24 hours.











THERON VERGARA MD              Aug 11, 2022 06:38

## 2022-08-11 NOTE — CARDIOLOGY PROGRESS NOTE
Subjective


Date Seen by Provider:  Aug 11, 2022


Time Seen by Provider:  08:32


Subjective/Events-last exam


Patient was seen at bedside, sitting comfortably, feeling better.  No new 

complaint.


Review of Systems


General:  No Chills, No Night Sweats, No Fatigue, No Malaise, No Appetite, No 

Other


HEENT:  No Head Aches, No Visual Changes, No Eye Pain, No Ear Pain, No Dysphasi

a, No Sinus Congestion, No Post Nasal Drip, No Sore Throat, No Other


Pulmonary:  No Dyspnea, No Cough, No Pleuritic Chest Pain, No Other


Cardiovascular:  No: Chest Pain, Palpitations, Orthopnea, Paroxysmal Noc. 

Dyspnea, Edema, Lt Headedness, Other





Objective-Cardiology


Exam


Last Set of Vital Signs





Vital Signs








 8/11/22 8/11/22 8/11/22





 04:00 04:33 07:00


 


Temp 36.0  


 


Pulse   87


 


Resp 18  


 


B/P (MAP) 95/48 (64)  


 


Pulse Ox  95 


 


O2 Delivery  Nasal Cannula 


 


O2 Flow Rate  5.00 








I&O











Intake and Output 


 


 8/11/22





 00:00


 


Intake Total 1400 ml


 


Output Total 450 ml


 


Balance 950 ml


 


 


 


Intake Oral 400 ml


 


IV Total 1000 ml


 


Output Urine Total 450 ml


 


Daily Weight Change No








General:  Alert, Oriented X3, Cooperative


HEENT:  Atraumatic, PERRLA


Neck:  Supple, No JVD, No Thyromegaly


Lungs:  Clear to Auscultation, Normal Air Movement


Heart:  Regular Rate, Normal S1, Normal S2, No Murmurs


Abdomen:  Normal Bowel Sounds, Soft, No Tenderness, No Hepatosplenomegaly, No 

Masses


Extremities:  No Clubbing, No Cyanosis, No Edema, Normal Pulses, No 

Tenderness/Swelling


Skin:  No Rashes, No Breakdown, No Significant Lesion


Neuro:  Normal Gait, Normal Speech, Strength at 5/5 X4 Ext, Normal Tone, 

Sensation Intact


Psych/Mental Status:  Mental Status NL, Mood NL





Results


Lab


Laboratory Tests


8/10/22 08:45








8/10/22 08:50














A/P-Cardiology


Admission Diagnosis


Claudication


Peripheral arterial disease


Coronary artery disease


Hypertension


Hyperlipidemia.





Assessment/Plan


Peripheral arterial disease-underwent peripheral angiogram on October 11, 2017 

revealing severe bilateral iliac stenosis, successful kidding stents deployed: 

Omnilink Elite [id number] on the right and [id number]. Mild to moderate 

disease at LLE down to foot with small vessel disease and slow flow to the foot 

level. Mild to moderate disease at RLE down to trifurcation.


LLE claudication pain,ABBY was abnormal with 0.46 on the left, 0.87 on the right.


Peripheral angiogram was done on August 10, 2022 showing total occlusion of the 

left iliac stent, complex intervention with balloon angioplasty and using 

kissing balloon to both iliac stents with excellent results.


Significant improvement of the pressure in the femoral artery post intervention


Planning to discharge on aspirin and Plavix





Coronary artery disease status post CABG x4 in 2009. Patient had a non-ST 

elevation myocardial infarction on February 22, 2016. Underwent cardiac 

catheterization revealing 99% stenosis at mid vein graft to dominant CX artery 

followed by thrombus; successful balloon angioplasty, and 2 stent deployment 

using Promus Premier 3.5 x 12mm followed by 3.5 x 28mm overlapping stents, 

excellent results, no residual stenosis. 





Hypertension, controlled. Continue to montior.





Hyperlipidemia, reports intolerance to Lipitor or Crestor secondary to myalgias.

Currently off medications.


Poorly controlled, will consider the use of Repatha





Tobaccoism-smoking 2 packs per day, educated on the importance of smoking 

cessation





Carotid artery stenosis-moderate by heart and vascular care. Patient reports no 

recent work up, I will evaluate carotid duplex.





GERD, refer to Dr. Gabriel





Hematochezia questionable hemorrhoids, chronic diarrhea, reports worse while on 

blood thinners, I will refer to Dr. Gabriel for further evaluation.











THERON CENTENO MD              Aug 11, 2022 08:34

## 2022-08-17 ENCOUNTER — HOSPITAL ENCOUNTER (OUTPATIENT)
Dept: HOSPITAL 75 - CARD | Age: 67
End: 2022-08-17
Attending: PHYSICIAN ASSISTANT
Payer: MEDICARE

## 2022-08-17 DIAGNOSIS — I11.9: Primary | ICD-10-CM

## 2022-08-17 PROCEDURE — 93306 TTE W/DOPPLER COMPLETE: CPT

## 2022-08-31 ENCOUNTER — HOSPITAL ENCOUNTER (OUTPATIENT)
Dept: HOSPITAL 75 - CARD | Age: 67
End: 2022-08-31
Attending: PHYSICIAN ASSISTANT
Payer: MEDICARE

## 2022-08-31 VITALS — SYSTOLIC BLOOD PRESSURE: 120 MMHG | DIASTOLIC BLOOD PRESSURE: 73 MMHG

## 2022-08-31 DIAGNOSIS — I25.10: Primary | ICD-10-CM

## 2022-08-31 PROCEDURE — 93017 CV STRESS TEST TRACING ONLY: CPT

## 2022-08-31 PROCEDURE — 78452 HT MUSCLE IMAGE SPECT MULT: CPT

## 2022-08-31 NOTE — CARDIOLOGY STRESS TEST REPORT
Stress Test Report


Date of Procedure/Referring:


Date of Procedure:  Aug 31, 2022


PCP


No,Local Physician


Admitting Physician


Admitting Physician:


 








Attending Physician:


Maranda Peters





Indications:


CAD





Baseline Heart Rate:


77





Baseline Blood Pressure:


Blood Pressure Systolic:  120


Blood Pressure Diastolic:  73


Baseline Vitals





Vital Signs








  Date Time  Temp Pulse Resp B/P (MAP) Pulse Ox O2 Delivery O2 Flow Rate FiO2


 


8/31/22 13:12  77  120/73 (89)    











Baseline EKG:


Baseline EKG:  NSR





Summary


After explaining the procedure to the patient, she  signed a consent and then 

brought to the stress nuclear laboratory.


Patient received 0.4 mg Lexiscan for stress test, ECG, heart rate and blood 

pressure were monitored continuously.  Resting and stress dose of radio tracer 

were injected, imaging was acquired and reviewed in short axis, horizontal long 

axis and vertical long axis views.


TID:  0.9


SSS:  7


SDS:  5


EF:  47


1.  Patient tolerated Lexiscan well


2.  Typical female pattern with no significant ischemia or infarction on SPECT 

images


3.  Normal left ventricular size, ejection fraction 47%











THERON CENTENO MD              Aug 31, 2022 17:20

## 2022-09-15 ENCOUNTER — HOSPITAL ENCOUNTER (OUTPATIENT)
Dept: HOSPITAL 75 - SDC | Age: 67
End: 2022-09-15
Attending: PHYSICIAN ASSISTANT
Payer: MEDICARE

## 2022-09-15 VITALS — WEIGHT: 150.36 LBS | BODY MASS INDEX: 24.16 KG/M2 | HEIGHT: 65.98 IN

## 2022-09-15 VITALS — SYSTOLIC BLOOD PRESSURE: 115 MMHG | DIASTOLIC BLOOD PRESSURE: 57 MMHG

## 2022-09-15 DIAGNOSIS — Z51.81: Primary | ICD-10-CM

## 2022-09-15 PROCEDURE — 96372 THER/PROPH/DIAG INJ SC/IM: CPT
